# Patient Record
Sex: FEMALE | Race: WHITE | NOT HISPANIC OR LATINO
[De-identification: names, ages, dates, MRNs, and addresses within clinical notes are randomized per-mention and may not be internally consistent; named-entity substitution may affect disease eponyms.]

---

## 2017-01-05 ENCOUNTER — APPOINTMENT (OUTPATIENT)
Dept: ORTHOPEDIC SURGERY | Facility: CLINIC | Age: 62
End: 2017-01-05

## 2017-01-05 VITALS
HEIGHT: 60.63 IN | BODY MASS INDEX: 32.32 KG/M2 | OXYGEN SATURATION: 97 % | HEART RATE: 88 BPM | WEIGHT: 169 LBS | SYSTOLIC BLOOD PRESSURE: 108 MMHG | DIASTOLIC BLOOD PRESSURE: 70 MMHG

## 2017-01-05 DIAGNOSIS — E21.0 PRIMARY HYPERPARATHYROIDISM: ICD-10-CM

## 2017-03-21 ENCOUNTER — INPATIENT (INPATIENT)
Facility: HOSPITAL | Age: 62
LOS: 0 days | Discharge: HOME | End: 2017-03-21
Attending: EMERGENCY MEDICINE | Admitting: INTERNAL MEDICINE

## 2017-03-28 ENCOUNTER — APPOINTMENT (OUTPATIENT)
Dept: HEART AND VASCULAR | Facility: CLINIC | Age: 62
End: 2017-03-28

## 2017-03-28 VITALS
BODY MASS INDEX: 32.2 KG/M2 | SYSTOLIC BLOOD PRESSURE: 124 MMHG | HEIGHT: 60 IN | DIASTOLIC BLOOD PRESSURE: 90 MMHG | WEIGHT: 164 LBS | HEART RATE: 69 BPM

## 2017-03-28 VITALS — SYSTOLIC BLOOD PRESSURE: 134 MMHG | DIASTOLIC BLOOD PRESSURE: 94 MMHG

## 2017-03-28 DIAGNOSIS — Z86.39 PERSONAL HISTORY OF OTHER ENDOCRINE, NUTRITIONAL AND METABOLIC DISEASE: ICD-10-CM

## 2017-03-28 DIAGNOSIS — I51.7 CARDIOMEGALY: ICD-10-CM

## 2017-03-28 DIAGNOSIS — R73.9 HYPERGLYCEMIA, UNSPECIFIED: ICD-10-CM

## 2017-03-29 LAB
ALBUMIN SERPL ELPH-MCNC: 4.6 G/DL
ALP BLD-CCNC: 73 U/L
ALT SERPL-CCNC: 31 U/L
ANION GAP SERPL CALC-SCNC: 17 MMOL/L
AST SERPL-CCNC: 25 U/L
BASOPHILS # BLD AUTO: 0.04 K/UL
BASOPHILS NFR BLD AUTO: 0.5 %
BILIRUB SERPL-MCNC: 0.5 MG/DL
BUN SERPL-MCNC: 22 MG/DL
CALCIUM SERPL-MCNC: 10.8 MG/DL
CHLORIDE SERPL-SCNC: 98 MMOL/L
CHOLEST SERPL-MCNC: 154 MG/DL
CHOLEST/HDLC SERPL: 2.9 RATIO
CO2 SERPL-SCNC: 28 MMOL/L
CREAT SERPL-MCNC: 0.71 MG/DL
EOSINOPHIL # BLD AUTO: 0.33 K/UL
EOSINOPHIL NFR BLD AUTO: 4.5 %
GLUCOSE SERPL-MCNC: 88 MG/DL
HBA1C MFR BLD HPLC: 5.6 %
HCT VFR BLD CALC: 47.5 %
HDLC SERPL-MCNC: 54 MG/DL
HGB BLD-MCNC: 14.4 G/DL
IMM GRANULOCYTES NFR BLD AUTO: 0.1 %
LDLC SERPL CALC-MCNC: 78 MG/DL
LYMPHOCYTES # BLD AUTO: 2.46 K/UL
LYMPHOCYTES NFR BLD AUTO: 33.5 %
MAN DIFF?: NORMAL
MCHC RBC-ENTMCNC: 27 PG
MCHC RBC-ENTMCNC: 30.3 GM/DL
MCV RBC AUTO: 89 FL
MONOCYTES # BLD AUTO: 0.41 K/UL
MONOCYTES NFR BLD AUTO: 5.6 %
NEUTROPHILS # BLD AUTO: 4.1 K/UL
NEUTROPHILS NFR BLD AUTO: 55.8 %
PLATELET # BLD AUTO: 342 K/UL
POTASSIUM SERPL-SCNC: 4.8 MMOL/L
PROT SERPL-MCNC: 7.4 G/DL
RBC # BLD: 5.34 M/UL
RBC # FLD: 15.1 %
SODIUM SERPL-SCNC: 143 MMOL/L
TRIGL SERPL-MCNC: 111 MG/DL
TSH SERPL-ACNC: 1.98 UIU/ML
WBC # FLD AUTO: 7.35 K/UL

## 2017-05-22 ENCOUNTER — MEDICATION RENEWAL (OUTPATIENT)
Age: 62
End: 2017-05-22

## 2017-06-15 ENCOUNTER — APPOINTMENT (OUTPATIENT)
Dept: ORTHOPEDIC SURGERY | Facility: CLINIC | Age: 62
End: 2017-06-15

## 2017-06-15 VITALS
OXYGEN SATURATION: 98 % | BODY MASS INDEX: 33.47 KG/M2 | HEART RATE: 71 BPM | HEIGHT: 60.63 IN | WEIGHT: 175 LBS | SYSTOLIC BLOOD PRESSURE: 130 MMHG | DIASTOLIC BLOOD PRESSURE: 90 MMHG

## 2017-06-15 DIAGNOSIS — Z87.442 PERSONAL HISTORY OF URINARY CALCULI: ICD-10-CM

## 2017-06-15 DIAGNOSIS — Z84.89 FAMILY HISTORY OF OTHER SPECIFIED CONDITIONS: ICD-10-CM

## 2017-06-15 DIAGNOSIS — Z86.69 PERSONAL HISTORY OF OTHER DISEASES OF THE NERVOUS SYSTEM AND SENSE ORGANS: ICD-10-CM

## 2017-06-15 DIAGNOSIS — O00.90 UNSPECIFIED. ECTOPIC. PREGNANCY WITHOUT INTRAUTERINE PREGNANCY: ICD-10-CM

## 2017-06-15 DIAGNOSIS — M17.10 UNILATERAL PRIMARY OSTEOARTHRITIS, UNSPECIFIED KNEE: ICD-10-CM

## 2017-06-27 DIAGNOSIS — R10.13 EPIGASTRIC PAIN: ICD-10-CM

## 2017-06-27 DIAGNOSIS — E78.5 HYPERLIPIDEMIA, UNSPECIFIED: ICD-10-CM

## 2017-06-27 DIAGNOSIS — R07.9 CHEST PAIN, UNSPECIFIED: ICD-10-CM

## 2017-06-27 DIAGNOSIS — R06.02 SHORTNESS OF BREATH: ICD-10-CM

## 2017-06-27 DIAGNOSIS — K21.9 GASTRO-ESOPHAGEAL REFLUX DISEASE WITHOUT ESOPHAGITIS: ICD-10-CM

## 2017-06-27 DIAGNOSIS — E03.9 HYPOTHYROIDISM, UNSPECIFIED: ICD-10-CM

## 2017-06-27 DIAGNOSIS — R07.89 OTHER CHEST PAIN: ICD-10-CM

## 2017-08-14 ENCOUNTER — RX RENEWAL (OUTPATIENT)
Age: 62
End: 2017-08-14

## 2018-01-11 ENCOUNTER — RX RENEWAL (OUTPATIENT)
Age: 63
End: 2018-01-11

## 2018-01-23 ENCOUNTER — APPOINTMENT (OUTPATIENT)
Dept: ORTHOPEDIC SURGERY | Facility: CLINIC | Age: 63
End: 2018-01-23
Payer: COMMERCIAL

## 2018-01-23 VITALS
HEART RATE: 96 BPM | DIASTOLIC BLOOD PRESSURE: 80 MMHG | OXYGEN SATURATION: 96 % | WEIGHT: 172 LBS | HEIGHT: 60 IN | BODY MASS INDEX: 33.77 KG/M2 | SYSTOLIC BLOOD PRESSURE: 120 MMHG

## 2018-01-23 DIAGNOSIS — Z87.39 PERSONAL HISTORY OF OTHER DISEASES OF THE MUSCULOSKELETAL SYSTEM AND CONNECTIVE TISSUE: ICD-10-CM

## 2018-01-23 DIAGNOSIS — Z86.39 PERSONAL HISTORY OF OTHER ENDOCRINE, NUTRITIONAL AND METABOLIC DISEASE: ICD-10-CM

## 2018-01-23 PROCEDURE — 99215 OFFICE O/P EST HI 40 MIN: CPT

## 2018-01-24 ENCOUNTER — TRANSCRIPTION ENCOUNTER (OUTPATIENT)
Age: 63
End: 2018-01-24

## 2018-05-31 ENCOUNTER — APPOINTMENT (OUTPATIENT)
Dept: HEART AND VASCULAR | Facility: CLINIC | Age: 63
End: 2018-05-31
Payer: COMMERCIAL

## 2018-05-31 ENCOUNTER — APPOINTMENT (OUTPATIENT)
Dept: HEART AND VASCULAR | Facility: CLINIC | Age: 63
End: 2018-05-31

## 2018-05-31 VITALS
WEIGHT: 168 LBS | HEIGHT: 60 IN | SYSTOLIC BLOOD PRESSURE: 120 MMHG | DIASTOLIC BLOOD PRESSURE: 86 MMHG | HEART RATE: 93 BPM | BODY MASS INDEX: 32.98 KG/M2

## 2018-05-31 PROCEDURE — 36415 COLL VENOUS BLD VENIPUNCTURE: CPT

## 2018-05-31 PROCEDURE — 93000 ELECTROCARDIOGRAM COMPLETE: CPT

## 2018-05-31 PROCEDURE — 99244 OFF/OP CNSLTJ NEW/EST MOD 40: CPT | Mod: 25

## 2018-05-31 RX ORDER — SCOPALAMINE 1 MG/3D
1 PATCH, EXTENDED RELEASE TRANSDERMAL
Qty: 5 | Refills: 0 | Status: DISCONTINUED | COMMUNITY
Start: 2017-06-16 | End: 2018-05-31

## 2018-06-01 ENCOUNTER — APPOINTMENT (OUTPATIENT)
Dept: HEART AND VASCULAR | Facility: CLINIC | Age: 63
End: 2018-06-01
Payer: COMMERCIAL

## 2018-06-01 DIAGNOSIS — T14.8XXA OTHER INJURY OF UNSPECIFIED BODY REGION, INITIAL ENCOUNTER: ICD-10-CM

## 2018-06-01 LAB
ANION GAP SERPL CALC-SCNC: 14 MMOL/L
BUN SERPL-MCNC: 25 MG/DL
CALCIUM SERPL-MCNC: 10.3 MG/DL
CHLORIDE SERPL-SCNC: 101 MMOL/L
CHOLEST SERPL-MCNC: 186 MG/DL
CHOLEST/HDLC SERPL: 2.9 RATIO
CO2 SERPL-SCNC: 26 MMOL/L
CREAT SERPL-MCNC: 0.79 MG/DL
GLUCOSE SERPL-MCNC: 84 MG/DL
HBA1C MFR BLD HPLC: 5.5 %
HDLC SERPL-MCNC: 64 MG/DL
LDLC SERPL CALC-MCNC: 103 MG/DL
POTASSIUM SERPL-SCNC: 4.5 MMOL/L
SODIUM SERPL-SCNC: 141 MMOL/L
TRIGL SERPL-MCNC: 97 MG/DL
TSH SERPL-ACNC: 2.11 UIU/ML

## 2018-06-01 PROCEDURE — 93306 TTE W/DOPPLER COMPLETE: CPT

## 2018-06-01 PROCEDURE — 99213 OFFICE O/P EST LOW 20 MIN: CPT | Mod: 25

## 2018-06-01 PROCEDURE — 93015 CV STRESS TEST SUPVJ I&R: CPT

## 2018-06-06 LAB
APTT BLD: 35.2 SEC
BASOPHILS # BLD AUTO: 0.01 K/UL
BASOPHILS NFR BLD AUTO: 0.2 %
EOSINOPHIL # BLD AUTO: 0.14 K/UL
EOSINOPHIL NFR BLD AUTO: 2.2 %
HCT VFR BLD CALC: 45.4 %
HGB BLD-MCNC: 14.3 G/DL
IMM GRANULOCYTES NFR BLD AUTO: 0.2 %
INR PPP: 0.93 RATIO
LYMPHOCYTES # BLD AUTO: 1.71 K/UL
LYMPHOCYTES NFR BLD AUTO: 26.9 %
MAN DIFF?: NORMAL
MCHC RBC-ENTMCNC: 27 PG
MCHC RBC-ENTMCNC: 31.5 GM/DL
MCV RBC AUTO: 85.7 FL
MONOCYTES # BLD AUTO: 0.39 K/UL
MONOCYTES NFR BLD AUTO: 6.1 %
NEUTROPHILS # BLD AUTO: 4.1 K/UL
NEUTROPHILS NFR BLD AUTO: 64.4 %
PLATELET # BLD AUTO: 352 K/UL
PT BLD: 10.5 SEC
RBC # BLD: 5.3 M/UL
RBC # FLD: 15 %
WBC # FLD AUTO: 6.36 K/UL

## 2018-12-13 ENCOUNTER — APPOINTMENT (OUTPATIENT)
Dept: HEART AND VASCULAR | Facility: CLINIC | Age: 63
End: 2018-12-13

## 2019-01-17 ENCOUNTER — APPOINTMENT (OUTPATIENT)
Dept: ORTHOPEDIC SURGERY | Facility: CLINIC | Age: 64
End: 2019-01-17
Payer: COMMERCIAL

## 2019-01-17 VITALS
WEIGHT: 130 LBS | OXYGEN SATURATION: 97 % | SYSTOLIC BLOOD PRESSURE: 115 MMHG | HEART RATE: 68 BPM | HEIGHT: 60 IN | BODY MASS INDEX: 25.52 KG/M2 | DIASTOLIC BLOOD PRESSURE: 80 MMHG

## 2019-01-17 PROCEDURE — 99215 OFFICE O/P EST HI 40 MIN: CPT

## 2019-01-17 NOTE — ASSESSMENT
[FreeTextEntry1] : \par \par 63 year old woman with documented non secreting pituitary microadenoma, clinical evidence for  PHPT, with parathyroidectomy Fall 2016.  Serum PTH and calcium are now normal and have been stable for a year. 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 continue off alendronate; this was dc'ed in Jan 2017\par 2. labs for PTH/ca/alb; 25 D and thyroid in 12/19\par 3. continue LT4 75 mcg/day skipping a pill once a month (effective dose is 72.5 mcg)\par 4. for now, with normal VF to confrontation, no pituitary MRI and pt to have formal VF testing at optho at her next visit\par

## 2019-01-17 NOTE — HISTORY OF PRESENT ILLNESS
[FreeTextEntry1] : This is a 1 year f/u visit for this 64 yo woman with a hx of primary hyperparathyroidism, s/p parathyroidectomy in Fall of 2016.\par For complete notes, see prior visits 2/9/16, 4/14/16 and 6/23/16.\par \par Brief summary:\par 63  year old woman with documented non secreting pituitary microadenoma, and clinical evidence for  PHPT. The pt met surgical criteria for parathyroidectomy as she has both osteoporosis and nephrolithiasis. \par she underwent right lower parathyroid surgery 11/7/2016; and finding was parathyroid hyperplasia\par Patient had been on bisphosphonates for 3 years- she had a better response  to alendronate than to ibandronate.\par She has been off bisphosphonates since after parathyroid surgery (fall 2016-present)\par In addition, with regard to non-secreting pituitary adenoma, pt's most recent pituitary MRI was 12/2014; adenoma was 9 x 7 x 6 mm and pt had normal optho exam; updated normal optho exam (did not include visual fields) in Dec 2018\par \par Updated hx:\par pt has been seeing nutritionist for about 1 year and has lost nearly 40 pounds! \par one possible episode of gravel in 2017; but updated renal sonogram from 12/2018 showed no calculi\par \par Op Rx Hx:\par 12/2013-3/2016                 ibandronate                  2.5 years\par 3/2016- 1/2017                 alendroante                  10 months\par 1/17- 1/19                         holiday                          2  year\par \par post op tests:\par 1. surgical pathology: 200 mg Right lower parathyroid, hyperplasia\par \par today, labs done 12/20/18 were rev and compared with: 12/21/17 and  5/8/17, 1/25/17 and 11/22/16:\par PTH/ca: 51/9.3  alb: 4.1; prior PTH/ca: 42/10.1   alb: 4.5;  43/9.3  alb: 4.1; and 37/10.2  alb: 4.1; Nov 2016: ca: 9.8; prior 83/10.4  \par alb:3.8\par creat:0.68, prior  0.65, 0.74, prior 0.76\par 25 D: 54,  55,  39 and 45\par BSAP: 13.6, prior: 14.9,  10.9\par CTX: 495\par prolactin: 6.1\par \par TSH: 1.67, prior: 1.36, 1.52, and 1.32\par Free T4: 1.4, prior: 1.3, 1.3, and 1.3\par \par Most recent  DXA done 12/1817 compared with prior studies 12/17/15\par T-scores: -1.7, -2.9, -1.9, -1.5, -1.4 at the LS, L FN, L TH and L 1/3 radius and L UD radius\par this represents a 3.9% increase at spine, and 7.7% increase at TH and stable at forearm\par Prior forearm:\par 1/3 radius: -1.7\par UD radius: -1.4\par \par Today, updated sonogram of kidneys, 12/2018 rev compare with  sonogram of kidneys done 12/1/16 :\par 2018: essentially normal, (no hypdronephrosis) small cyst in lower pole of left kidney compared with:\par mild left hydronephrosis, similar to that seen on prior studies 11/2015 and January 2015.\par left ureteral calculus on CT in distal ureter, but not fully obstructive\par \par  thyroid ultrasound, done 7/21/16:\par both lobes of thyroid are homogeneous, normal size, isthmus has subcentimeter nodule of 0.6 x 0.5 x 0.7\par There is a 1.0 x 0.6 x0.3 cm nodule in the region of the right inferior parathyroid, likely an adenoma. at inferior right pole of thyroid\par \par PE: \par VFF to confrontation\par \par calcium: citracal petites, 1 AM and 2 PM = 600 mg calcium\par vitamin D: 5000 IU/week

## 2019-01-17 NOTE — REASON FOR VISIT
[Follow-Up: _____] : a [unfilled] follow-up visit [FreeTextEntry1] : management of previous primary hyperparathyroidism; s/p parathyroidectomy

## 2019-11-07 ENCOUNTER — APPOINTMENT (OUTPATIENT)
Dept: HEART AND VASCULAR | Facility: CLINIC | Age: 64
End: 2019-11-07
Payer: COMMERCIAL

## 2019-11-07 VITALS
BODY MASS INDEX: 28.47 KG/M2 | DIASTOLIC BLOOD PRESSURE: 90 MMHG | HEART RATE: 92 BPM | SYSTOLIC BLOOD PRESSURE: 130 MMHG | WEIGHT: 145 LBS | HEIGHT: 60 IN

## 2019-11-07 VITALS — DIASTOLIC BLOOD PRESSURE: 82 MMHG | SYSTOLIC BLOOD PRESSURE: 120 MMHG

## 2019-11-07 VITALS — DIASTOLIC BLOOD PRESSURE: 74 MMHG | SYSTOLIC BLOOD PRESSURE: 114 MMHG

## 2019-11-07 VITALS — DIASTOLIC BLOOD PRESSURE: 86 MMHG | SYSTOLIC BLOOD PRESSURE: 112 MMHG

## 2019-11-07 VITALS — DIASTOLIC BLOOD PRESSURE: 82 MMHG | SYSTOLIC BLOOD PRESSURE: 112 MMHG

## 2019-11-07 DIAGNOSIS — R40.4 TRANSIENT ALTERATION OF AWARENESS: ICD-10-CM

## 2019-11-07 DIAGNOSIS — R94.31 ABNORMAL ELECTROCARDIOGRAM [ECG] [EKG]: ICD-10-CM

## 2019-11-07 PROCEDURE — 93000 ELECTROCARDIOGRAM COMPLETE: CPT

## 2019-11-07 PROCEDURE — 36415 COLL VENOUS BLD VENIPUNCTURE: CPT

## 2019-11-07 PROCEDURE — 93306 TTE W/DOPPLER COMPLETE: CPT

## 2019-11-07 PROCEDURE — 99244 OFF/OP CNSLTJ NEW/EST MOD 40: CPT | Mod: 25

## 2019-11-07 NOTE — DISCUSSION/SUMMARY
[FreeTextEntry1] : The patient has had prolonged dizziness but is able to walk during the episode. She has had other episodes. Her EKG is abnormal but unchanged. The echocardiogram showed an ejection fraction of 65% without significant valvular disease. The patient will undergo a one-week cardiac monitor. There is no orthostatic change in her blood pressure. She will continue her current medication.

## 2019-11-07 NOTE — PHYSICAL EXAM
[Normal Appearance] : normal appearance [General Appearance - Well Developed] : well developed [General Appearance - Well Nourished] : well nourished [No Deformities] : no deformities [Well Groomed] : well groomed [Eyelids - No Xanthelasma] : the eyelids demonstrated no xanthelasmas [General Appearance - In No Acute Distress] : no acute distress [Normal Conjunctiva] : the conjunctiva exhibited no abnormalities [No Oral Pallor] : no oral pallor [Normal Oral Mucosa] : normal oral mucosa [Normal Jugular Venous A Waves Present] : normal jugular venous A waves present [No Oral Cyanosis] : no oral cyanosis [Normal Jugular Venous V Waves Present] : normal jugular venous V waves present [No Jugular Venous Laird A Waves] : no jugular venous laird A waves [Respiration, Rhythm And Depth] : normal respiratory rhythm and effort [Exaggerated Use Of Accessory Muscles For Inspiration] : no accessory muscle use [Auscultation Breath Sounds / Voice Sounds] : lungs were clear to auscultation bilaterally [Murmurs] : no murmurs present [Heart Rate And Rhythm] : heart rate and rhythm were normal [Heart Sounds] : normal S1 and S2 [Abdomen Soft] : soft [Abdomen Tenderness] : non-tender [Abnormal Walk] : normal gait [Gait - Sufficient For Exercise Testing] : the gait was sufficient for exercise testing [Abdomen Mass (___ Cm)] : no abdominal mass palpated [Cyanosis, Localized] : no localized cyanosis [Nail Clubbing] : no clubbing of the fingernails [Petechial Hemorrhages (___cm)] : no petechial hemorrhages [No Venous Stasis] : no venous stasis [] : no ischemic changes [No Skin Ulcers] : no skin ulcer [No Xanthoma] : no  xanthoma was observed [FreeTextEntry1] :  Darkening of skin over a third of left ventral forearm.  [Oriented To Time, Place, And Person] : oriented to person, place, and time [Mood] : the mood was normal [Affect] : the affect was normal [No Anxiety] : not feeling anxious

## 2019-11-07 NOTE — HISTORY OF PRESENT ILLNESS
[FreeTextEntry1] : The patient has been having lightheadedness and facial pressure. She walked 30 blocks to get here and felt dizzy for half of the trip. It has also been occurring at other times. It is mild and passes spontaneously. She has a mild nasal drip. She denies chest discomfort. The patient feels a racing heartbeat for half a minute. She denies vertigo. She is fasting.

## 2019-11-08 DIAGNOSIS — D72.819 DECREASED WHITE BLOOD CELL COUNT, UNSPECIFIED: ICD-10-CM

## 2019-11-08 LAB
25(OH)D3 SERPL-MCNC: 54.3 NG/ML
ALBUMIN SERPL ELPH-MCNC: 4.4 G/DL
ALP BLD-CCNC: 70 U/L
ALT SERPL-CCNC: 15 U/L
ANION GAP SERPL CALC-SCNC: 17 MMOL/L
AST SERPL-CCNC: 24 U/L
BASOPHILS # BLD AUTO: 0.03 K/UL
BASOPHILS NFR BLD AUTO: 0.8 %
BILIRUB SERPL-MCNC: 0.5 MG/DL
BUN SERPL-MCNC: 24 MG/DL
CALCIUM SERPL-MCNC: 9.5 MG/DL
CALCIUM SERPL-MCNC: 9.5 MG/DL
CHLORIDE SERPL-SCNC: 104 MMOL/L
CHOLEST SERPL-MCNC: 141 MG/DL
CHOLEST/HDLC SERPL: 2.2 RATIO
CO2 SERPL-SCNC: 21 MMOL/L
CREAT SERPL-MCNC: 0.65 MG/DL
EOSINOPHIL # BLD AUTO: 0.12 K/UL
EOSINOPHIL NFR BLD AUTO: 3.2 %
ESTIMATED AVERAGE GLUCOSE: 108 MG/DL
GLUCOSE SERPL-MCNC: 84 MG/DL
HBA1C MFR BLD HPLC: 5.4 %
HCT VFR BLD CALC: 45.3 %
HDLC SERPL-MCNC: 65 MG/DL
HGB BLD-MCNC: 14 G/DL
IMM GRANULOCYTES NFR BLD AUTO: 0 %
LDLC SERPL CALC-MCNC: 68 MG/DL
LYMPHOCYTES # BLD AUTO: 1.4 K/UL
LYMPHOCYTES NFR BLD AUTO: 37.4 %
MAN DIFF?: NORMAL
MCHC RBC-ENTMCNC: 28.2 PG
MCHC RBC-ENTMCNC: 30.9 GM/DL
MCV RBC AUTO: 91.1 FL
MONOCYTES # BLD AUTO: 0.3 K/UL
MONOCYTES NFR BLD AUTO: 8 %
NEUTROPHILS # BLD AUTO: 1.89 K/UL
NEUTROPHILS NFR BLD AUTO: 50.6 %
PARATHYROID HORMONE INTACT: 30 PG/ML
PLATELET # BLD AUTO: 263 K/UL
POTASSIUM SERPL-SCNC: 4.1 MMOL/L
PROT SERPL-MCNC: 6.9 G/DL
RBC # BLD: 4.97 M/UL
RBC # FLD: 13.6 %
SODIUM SERPL-SCNC: 142 MMOL/L
T4 FREE SERPL-MCNC: 1.6 NG/DL
TRIGL SERPL-MCNC: 40 MG/DL
TSH SERPL-ACNC: 1.95 UIU/ML
WBC # FLD AUTO: 3.74 K/UL

## 2019-11-17 LAB — ALP BONE SERPL-MCNC: 15 MCG/L

## 2019-11-21 ENCOUNTER — TRANSCRIPTION ENCOUNTER (OUTPATIENT)
Age: 64
End: 2019-11-21

## 2019-12-20 ENCOUNTER — APPOINTMENT (OUTPATIENT)
Dept: ORTHOPEDIC SURGERY | Facility: CLINIC | Age: 64
End: 2019-12-20

## 2019-12-31 ENCOUNTER — APPOINTMENT (OUTPATIENT)
Dept: ORTHOPEDIC SURGERY | Facility: CLINIC | Age: 64
End: 2019-12-31
Payer: COMMERCIAL

## 2019-12-31 VITALS
WEIGHT: 145 LBS | DIASTOLIC BLOOD PRESSURE: 84 MMHG | HEART RATE: 64 BPM | OXYGEN SATURATION: 97 % | BODY MASS INDEX: 28.47 KG/M2 | SYSTOLIC BLOOD PRESSURE: 140 MMHG | HEIGHT: 60 IN

## 2019-12-31 DIAGNOSIS — Z86.39 PERSONAL HISTORY OF OTHER ENDOCRINE, NUTRITIONAL AND METABOLIC DISEASE: ICD-10-CM

## 2019-12-31 PROCEDURE — 99214 OFFICE O/P EST MOD 30 MIN: CPT

## 2019-12-31 NOTE — HISTORY OF PRESENT ILLNESS
[FreeTextEntry1] : This is a 1 year f/u visit for this 65 yo woman with a hx of primary hyperparathyroidism, s/p parathyroidectomy in Fall of 2016.\par For complete notes, see prior visits 2/9/16, 4/14/16 and 6/23/16.\par \par Brief summary:\par 64  year old woman with documented non secreting pituitary microadenoma, and clinical evidence for  PHPT. The pt met surgical criteria for parathyroidectomy as she has both osteoporosis and nephrolithiasis. \par she underwent right lower parathyroid surgery 11/7/2016 (Dr. Matias Burnett); and finding was parathyroid hyperplasia\par Patient had been on bisphosphonates for 3 years- she had a better response  to alendronate than to ibandronate.\par She has been off bisphosphonates since after parathyroid surgery (fall 2016-present)\par In addition, with regard to non-secreting pituitary adenoma, pt's most recent pituitary MRI was 12/2014; adenoma was 9 x 7 x 6 mm and pt had normal optho exam; updated normal optho exam (did not include visual fields) in Dec 2018\par \par Updated hx:\par pt has been seeing nutritionist 2017,   and has lost nearly 40 pounds! \par one possible episode of gravel in 2017; but updated renal sonogram from 12/2018 showed no calculi\par \par Op Rx Hx:\par 12/2013-3/2016                 ibandronate                  2.5 years\par 3/2016- 1/2017                 alendroante                  10 months\par 1/17- 12/19                         holiday                          3  year\par \par post op tests:\par 1. surgical pathology: 200 mg Right lower parathyroid, hyperplasia\par \par today, labs done 11/7/19 were rev and compared with: 12/20/18 were rev and compared with: 12/21/17 and  5/8/17, 1/25/17 and 11/22/16:\par PTH/ca: 51/9.3  alb: 4.1; prior PTH/ca: 42/10.1   alb: 4.5;  43/9.3  alb: 4.1; and 37/10.2  alb: 4.1; Nov 2016: ca: 9.8; prior 83/10.4  \par alb:3.8\par creat:0.65, 0.68, prior  0.65, 0.74, prior 0.76\par 25 D: 54.3, 54,  55,  39 and 45\par BSAP: 15, 13.6, prior: 14.9,  10.9\par CTX: 391, 495\par prolactin: 6.1\par \par TSH: 1.95, 1.67, prior: 1.36, 1.52, and 1.32\par Free T4: 1.6, 1.4, prior: 1.3, 1.3, and 1.3\par \par Updated DXA, done 12/16/19 was rev and compared with:  DXA done 12/18/17 and 12/17/15\par T-scores: -2.1, -2.9, -2.4 and -1.3 at the LS, L FN, L TH and L 1/3 radius\par T-scores: -1.7, -2.9, -1.9, -1.5, -1.4 at the LS, L FN, L TH and L 1/3 radius and L UD radius\par this represents a 3.9% increase at spine, and 7.7% increase at TH and stable at forearm\par Prior forearm:\par 1/3 radius: -1.7\par UD radius: -1.4\par \par Today, updated sonogram of kidneys, 12/2018 rev compare with  sonogram of kidneys done 12/1/16 :\par 2018: essentially normal, (no hypdronephrosis) small cyst in lower pole of left kidney compared with:\par mild left hydronephrosis, similar to that seen on prior studies 11/2015 and January 2015.\par left ureteral calculus on CT in distal ureter, but not fully obstructive\par \par  thyroid ultrasound, done 7/21/16:\par both lobes of thyroid are homogeneous, normal size, isthmus has subcentimeter nodule of 0.6 x 0.5 x 0.7\par There is a 1.0 x 0.6 x0.3 cm nodule in the region of the right inferior parathyroid, likely an adenoma. at inferior right pole of thyroid\par \par PE: \par VFF to confrontation\par \par calcium: citracal petites, 1 AM and 2 PM = 600 mg calcium\par vitamin D: 5000 IU/week

## 2019-12-31 NOTE — ASSESSMENT
[FreeTextEntry1] : \par 64 year old woman with documented non secreting pituitary microadenoma  clinical evidence for  PHPT, with parathyroidectomy Fall 2016.  Serum PTH and calcium are now normal and have been stable. 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  Updated MRI can be considered if clinically indicated.\par Thyroid replacement is now optimal on 75 mcg/day (skipping one dose a month).\par \par Plan:\par 1. continue off alendronate; this was dc'ed in Jan 2017; might consider resume bisphosphonate or anti resorptive after next DXA.\par 2. labs for PTH/ca/alb; 25 D and thyroid in 12/20\par 3. continue LT4 75 mcg/day skipping a pill once a month (effective dose is 72.5 mcg)\par 4. for now, with normal VF to confrontation, no pituitary MRI and pt to have formal VF testing at optho at her next visit\par

## 2020-01-21 ENCOUNTER — APPOINTMENT (OUTPATIENT)
Dept: ORTHOPEDIC SURGERY | Facility: CLINIC | Age: 65
End: 2020-01-21

## 2020-10-27 ENCOUNTER — NON-APPOINTMENT (OUTPATIENT)
Age: 65
End: 2020-10-27

## 2020-11-17 ENCOUNTER — NON-APPOINTMENT (OUTPATIENT)
Age: 65
End: 2020-11-17

## 2020-11-17 ENCOUNTER — APPOINTMENT (OUTPATIENT)
Dept: HEART AND VASCULAR | Facility: CLINIC | Age: 65
End: 2020-11-17
Payer: COMMERCIAL

## 2020-11-17 ENCOUNTER — APPOINTMENT (OUTPATIENT)
Dept: ENDOCRINOLOGY | Facility: CLINIC | Age: 65
End: 2020-11-17
Payer: COMMERCIAL

## 2020-11-17 VITALS
WEIGHT: 150 LBS | HEIGHT: 60 IN | DIASTOLIC BLOOD PRESSURE: 90 MMHG | SYSTOLIC BLOOD PRESSURE: 132 MMHG | BODY MASS INDEX: 29.45 KG/M2

## 2020-11-17 VITALS
HEIGHT: 60 IN | SYSTOLIC BLOOD PRESSURE: 115 MMHG | HEART RATE: 84 BPM | DIASTOLIC BLOOD PRESSURE: 80 MMHG | WEIGHT: 150 LBS | BODY MASS INDEX: 29.45 KG/M2

## 2020-11-17 VITALS — TEMPERATURE: 98.4 F

## 2020-11-17 PROCEDURE — 93015 CV STRESS TEST SUPVJ I&R: CPT

## 2020-11-17 PROCEDURE — 99214 OFFICE O/P EST MOD 30 MIN: CPT | Mod: 25

## 2020-11-17 PROCEDURE — 99215 OFFICE O/P EST HI 40 MIN: CPT | Mod: 25

## 2020-11-17 PROCEDURE — 99072 ADDL SUPL MATRL&STAF TM PHE: CPT

## 2020-11-17 RX ORDER — CALCIUM CARBONATE/VITAMIN D3 600 MG-10
TABLET ORAL
Refills: 0 | Status: ACTIVE | COMMUNITY

## 2020-11-18 LAB
ANION GAP SERPL CALC-SCNC: 12 MMOL/L
BUN SERPL-MCNC: 26 MG/DL
CALCIUM SERPL-MCNC: 10.2 MG/DL
CHLORIDE SERPL-SCNC: 102 MMOL/L
CHOLEST SERPL-MCNC: 171 MG/DL
CO2 SERPL-SCNC: 27 MMOL/L
CREAT SERPL-MCNC: 0.63 MG/DL
GLUCOSE SERPL-MCNC: 97 MG/DL
HDLC SERPL-MCNC: 68 MG/DL
LDLC SERPL CALC-MCNC: 91 MG/DL
NONHDLC SERPL-MCNC: 103 MG/DL
POTASSIUM SERPL-SCNC: 4.4 MMOL/L
SODIUM SERPL-SCNC: 141 MMOL/L
TRIGL SERPL-MCNC: 56 MG/DL

## 2020-11-19 ENCOUNTER — NON-APPOINTMENT (OUTPATIENT)
Age: 65
End: 2020-11-19

## 2020-11-19 NOTE — REVIEW OF SYSTEMS
[Dyspnea on exertion] : dyspnea during exertion [Chest Pain] : chest pain [Palpitations] : palpitations [Dizziness] : dizziness [Negative] : Heme/Lymph

## 2020-11-19 NOTE — PHYSICAL EXAM
[General Appearance - Well Developed] : well developed [Normal Appearance] : normal appearance [Well Groomed] : well groomed [General Appearance - Well Nourished] : well nourished [No Deformities] : no deformities [General Appearance - In No Acute Distress] : no acute distress [Normal Conjunctiva] : the conjunctiva exhibited no abnormalities [Eyelids - No Xanthelasma] : the eyelids demonstrated no xanthelasmas [Normal Oral Mucosa] : normal oral mucosa [No Oral Pallor] : no oral pallor [No Oral Cyanosis] : no oral cyanosis [Normal Jugular Venous A Waves Present] : normal jugular venous A waves present [Normal Jugular Venous V Waves Present] : normal jugular venous V waves present [No Jugular Venous Laird A Waves] : no jugular venous laird A waves [Respiration, Rhythm And Depth] : normal respiratory rhythm and effort [Exaggerated Use Of Accessory Muscles For Inspiration] : no accessory muscle use [Auscultation Breath Sounds / Voice Sounds] : lungs were clear to auscultation bilaterally [Heart Rate And Rhythm] : heart rate and rhythm were normal [Heart Sounds] : normal S1 and S2 [Murmurs] : no murmurs present [Abdomen Soft] : soft [Abdomen Tenderness] : non-tender [Abdomen Mass (___ Cm)] : no abdominal mass palpated [Abnormal Walk] : normal gait [Gait - Sufficient For Exercise Testing] : the gait was sufficient for exercise testing [Nail Clubbing] : no clubbing of the fingernails [Cyanosis, Localized] : no localized cyanosis [Petechial Hemorrhages (___cm)] : no petechial hemorrhages [] : no rash [No Venous Stasis] : no venous stasis [No Skin Ulcers] : no skin ulcer [No Xanthoma] : no  xanthoma was observed [FreeTextEntry1] :  Darkening of skin over a third of left ventral forearm.  [Oriented To Time, Place, And Person] : oriented to person, place, and time [Affect] : the affect was normal [Mood] : the mood was normal [No Anxiety] : not feeling anxious

## 2020-11-19 NOTE — HISTORY OF PRESENT ILLNESS
[FreeTextEntry1] : The patient has felt a pulling near her chin. She walks 20 minutes without difficulty. She has dyspnea on an incline which is unchanged. The patient has lower chest discomfort in bed and not with exercise. It occurred last month twice a day and she hasn't now. It is a pressure/burning which lasts for one to two minutes. It occurs with overeating and is mild. She has had in the past. She also has infrequent palpitations and dizziness.

## 2020-11-23 ENCOUNTER — TRANSCRIPTION ENCOUNTER (OUTPATIENT)
Age: 65
End: 2020-11-23

## 2020-11-24 ENCOUNTER — TRANSCRIPTION ENCOUNTER (OUTPATIENT)
Age: 65
End: 2020-11-24

## 2021-04-10 ENCOUNTER — EMERGENCY (EMERGENCY)
Facility: HOSPITAL | Age: 66
LOS: 1 days | Discharge: HOME | End: 2021-04-10
Attending: EMERGENCY MEDICINE | Admitting: EMERGENCY MEDICINE
Payer: COMMERCIAL

## 2021-04-10 VITALS
OXYGEN SATURATION: 98 % | SYSTOLIC BLOOD PRESSURE: 108 MMHG | TEMPERATURE: 98 F | DIASTOLIC BLOOD PRESSURE: 71 MMHG | HEART RATE: 78 BPM | RESPIRATION RATE: 18 BRPM

## 2021-04-10 VITALS
DIASTOLIC BLOOD PRESSURE: 93 MMHG | OXYGEN SATURATION: 96 % | RESPIRATION RATE: 18 BRPM | WEIGHT: 160.06 LBS | TEMPERATURE: 99 F | SYSTOLIC BLOOD PRESSURE: 142 MMHG | HEART RATE: 86 BPM

## 2021-04-10 DIAGNOSIS — Z20.822 CONTACT WITH AND (SUSPECTED) EXPOSURE TO COVID-19: ICD-10-CM

## 2021-04-10 DIAGNOSIS — E78.5 HYPERLIPIDEMIA, UNSPECIFIED: ICD-10-CM

## 2021-04-10 DIAGNOSIS — R07.89 OTHER CHEST PAIN: ICD-10-CM

## 2021-04-10 DIAGNOSIS — E03.9 HYPOTHYROIDISM, UNSPECIFIED: ICD-10-CM

## 2021-04-10 LAB
ALBUMIN SERPL ELPH-MCNC: 4 G/DL — SIGNIFICANT CHANGE UP (ref 3.3–5)
ALP SERPL-CCNC: 71 U/L — SIGNIFICANT CHANGE UP (ref 40–120)
ALT FLD-CCNC: 24 U/L — SIGNIFICANT CHANGE UP (ref 10–45)
ANION GAP SERPL CALC-SCNC: 12 MMOL/L — SIGNIFICANT CHANGE UP (ref 5–17)
APTT BLD: 32.9 SEC — SIGNIFICANT CHANGE UP (ref 27.5–35.5)
AST SERPL-CCNC: 20 U/L — SIGNIFICANT CHANGE UP (ref 10–40)
BASOPHILS # BLD AUTO: 0.03 K/UL — SIGNIFICANT CHANGE UP (ref 0–0.2)
BASOPHILS NFR BLD AUTO: 0.7 % — SIGNIFICANT CHANGE UP (ref 0–2)
BILIRUB SERPL-MCNC: 0.5 MG/DL — SIGNIFICANT CHANGE UP (ref 0.2–1.2)
BUN SERPL-MCNC: 18 MG/DL — SIGNIFICANT CHANGE UP (ref 7–23)
CALCIUM SERPL-MCNC: 9.6 MG/DL — SIGNIFICANT CHANGE UP (ref 8.4–10.5)
CHLORIDE SERPL-SCNC: 106 MMOL/L — SIGNIFICANT CHANGE UP (ref 96–108)
CK SERPL-CCNC: 116 U/L — SIGNIFICANT CHANGE UP (ref 25–170)
CO2 SERPL-SCNC: 25 MMOL/L — SIGNIFICANT CHANGE UP (ref 22–31)
CREAT SERPL-MCNC: 0.56 MG/DL — SIGNIFICANT CHANGE UP (ref 0.5–1.3)
CRP SERPL-MCNC: 0.5 MG/L — SIGNIFICANT CHANGE UP (ref 0–4)
EOSINOPHIL # BLD AUTO: 0.11 K/UL — SIGNIFICANT CHANGE UP (ref 0–0.5)
EOSINOPHIL NFR BLD AUTO: 2.4 % — SIGNIFICANT CHANGE UP (ref 0–6)
GLUCOSE SERPL-MCNC: 87 MG/DL — SIGNIFICANT CHANGE UP (ref 70–99)
HCT VFR BLD CALC: 43.7 % — SIGNIFICANT CHANGE UP (ref 34.5–45)
HGB BLD-MCNC: 14 G/DL — SIGNIFICANT CHANGE UP (ref 11.5–15.5)
IMM GRANULOCYTES NFR BLD AUTO: 0.4 % — SIGNIFICANT CHANGE UP (ref 0–1.5)
INR BLD: 0.98 — SIGNIFICANT CHANGE UP (ref 0.88–1.16)
LYMPHOCYTES # BLD AUTO: 1.09 K/UL — SIGNIFICANT CHANGE UP (ref 1–3.3)
LYMPHOCYTES # BLD AUTO: 24.3 % — SIGNIFICANT CHANGE UP (ref 13–44)
MCHC RBC-ENTMCNC: 27.3 PG — SIGNIFICANT CHANGE UP (ref 27–34)
MCHC RBC-ENTMCNC: 32 GM/DL — SIGNIFICANT CHANGE UP (ref 32–36)
MCV RBC AUTO: 85.2 FL — SIGNIFICANT CHANGE UP (ref 80–100)
MONOCYTES # BLD AUTO: 0.32 K/UL — SIGNIFICANT CHANGE UP (ref 0–0.9)
MONOCYTES NFR BLD AUTO: 7.1 % — SIGNIFICANT CHANGE UP (ref 2–14)
NEUTROPHILS # BLD AUTO: 2.92 K/UL — SIGNIFICANT CHANGE UP (ref 1.8–7.4)
NEUTROPHILS NFR BLD AUTO: 65.1 % — SIGNIFICANT CHANGE UP (ref 43–77)
NRBC # BLD: 0 /100 WBCS — SIGNIFICANT CHANGE UP (ref 0–0)
NT-PROBNP SERPL-SCNC: 61 PG/ML — SIGNIFICANT CHANGE UP (ref 0–300)
PLATELET # BLD AUTO: 269 K/UL — SIGNIFICANT CHANGE UP (ref 150–400)
POTASSIUM SERPL-MCNC: 3.8 MMOL/L — SIGNIFICANT CHANGE UP (ref 3.5–5.3)
POTASSIUM SERPL-SCNC: 3.8 MMOL/L — SIGNIFICANT CHANGE UP (ref 3.5–5.3)
PROT SERPL-MCNC: 7.3 G/DL — SIGNIFICANT CHANGE UP (ref 6–8.3)
PROTHROM AB SERPL-ACNC: 11.8 SEC — SIGNIFICANT CHANGE UP (ref 10.6–13.6)
RBC # BLD: 5.13 M/UL — SIGNIFICANT CHANGE UP (ref 3.8–5.2)
RBC # FLD: 14.2 % — SIGNIFICANT CHANGE UP (ref 10.3–14.5)
SARS-COV-2 RNA SPEC QL NAA+PROBE: SIGNIFICANT CHANGE UP
SODIUM SERPL-SCNC: 143 MMOL/L — SIGNIFICANT CHANGE UP (ref 135–145)
TROPONIN T SERPL-MCNC: 0.01 NG/ML — SIGNIFICANT CHANGE UP (ref 0–0.01)
WBC # BLD: 4.49 K/UL — SIGNIFICANT CHANGE UP (ref 3.8–10.5)
WBC # FLD AUTO: 4.49 K/UL — SIGNIFICANT CHANGE UP (ref 3.8–10.5)

## 2021-04-10 PROCEDURE — 99285 EMERGENCY DEPT VISIT HI MDM: CPT | Mod: 25

## 2021-04-10 PROCEDURE — 71045 X-RAY EXAM CHEST 1 VIEW: CPT

## 2021-04-10 PROCEDURE — 85025 COMPLETE CBC W/AUTO DIFF WBC: CPT

## 2021-04-10 PROCEDURE — 86140 C-REACTIVE PROTEIN: CPT

## 2021-04-10 PROCEDURE — 85730 THROMBOPLASTIN TIME PARTIAL: CPT

## 2021-04-10 PROCEDURE — 80053 COMPREHEN METABOLIC PANEL: CPT

## 2021-04-10 PROCEDURE — 85610 PROTHROMBIN TIME: CPT

## 2021-04-10 PROCEDURE — 83880 ASSAY OF NATRIURETIC PEPTIDE: CPT

## 2021-04-10 PROCEDURE — 71045 X-RAY EXAM CHEST 1 VIEW: CPT | Mod: 26

## 2021-04-10 PROCEDURE — 93005 ELECTROCARDIOGRAM TRACING: CPT

## 2021-04-10 PROCEDURE — 36415 COLL VENOUS BLD VENIPUNCTURE: CPT

## 2021-04-10 PROCEDURE — 84484 ASSAY OF TROPONIN QUANT: CPT

## 2021-04-10 PROCEDURE — 82550 ASSAY OF CK (CPK): CPT

## 2021-04-10 PROCEDURE — U0003: CPT

## 2021-04-10 PROCEDURE — 93010 ELECTROCARDIOGRAM REPORT: CPT

## 2021-04-10 PROCEDURE — U0005: CPT

## 2021-04-10 RX ORDER — METOPROLOL TARTRATE 50 MG
1 TABLET ORAL
Qty: 30 | Refills: 0
Start: 2021-04-10 | End: 2021-05-09

## 2021-04-10 RX ORDER — METOPROLOL TARTRATE 50 MG
50 TABLET ORAL DAILY
Refills: 0 | Status: DISCONTINUED | OUTPATIENT
Start: 2021-04-10 | End: 2021-04-14

## 2021-04-10 RX ADMIN — Medication 50 MILLIGRAM(S): at 15:06

## 2021-04-10 NOTE — ED PROVIDER NOTE - NSFOLLOWUPINSTRUCTIONS_ED_ALL_ED_FT
CHEST PAIN - General Information           Chest Pain    WHAT YOU NEED TO KNOW:    What do I need to know about chest pain? Chest pain can be caused by a range of conditions, from not serious to life-threatening.    What may cause or increase my risk for chest pain?   •A digestion problem, such as acid reflux or a stomach ulcer      •An anxiety attack or a strong emotion, such as anger      •Infection, inflammation, or a fracture in the bones or cartilage in your chest      •Poor blood flow to your heart (angina)      •A life-threatening condition, such as a heart attack or blood clot in your lungs      What other symptoms might I have with chest pain?   •A burning feeling behind your breastbone      •A racing or slow heartbeat      •Fever or sweating      •Nausea or vomiting      •Shortness of breath      •Discomfort or pressure that spreads from your chest to your back, jaw, or arm      •Feeling weak, tired, or faint      How is the cause of chest pain diagnosed? Your healthcare provider will examine you. Describe your chest pain in as much detail as possible. Tell him or her where your pain is and when it began. Tell the provider if you notice anything that makes the pain worse or better. Tell him or her if it is constant or comes and goes. Your healthcare provider will ask about any medicines you use and medical conditions you have. He or she will also examine you. You may also need any of the following tests:  •An EKG is a test that records your heart's electrical activity.      •Blood tests check for heart damage and signs of a heart attack.      •An echocardiogram uses sound waves to see if blood is flowing normally through your heart.      •An ultrasound, x-ray, CT, or MRI scan may show the cause of your chest pain. You may be given contrast liquid to help your heart show up better in the pictures. Tell the healthcare provider if you have ever had an allergic reaction to contrast liquid. Do not enter the MRI room with anything metal. Metal can cause serious injury. Tell the healthcare provider if you have any metal in or on your body.      •An endoscopy may be done to check for ulcers or problem with your esophagus.  Upper Endoscopy           How is chest pain treated?   •Medicines may be given to treat the cause of your chest pain. Examples include pain medicine, anxiety medicine, or medicines to increase blood flow to your heart. Do not take certain medicines without asking your healthcare provider first. These include NSAIDs, herbal or vitamin supplements, or hormones (estrogen or progestin).      •A stent may be placed if your chest pain is caused by blockage in your heart. A stent is a wire mesh tube that helps hold your artery open. You may need more than 1 stent.      What are some healthy living tips? The following are general healthy guidelines. If the cause of your chest pain is known, your healthcare provider will give you specific guidelines to follow.  •Do not smoke. Nicotine and other chemicals in cigarettes and cigars can cause lung and heart damage. Ask your healthcare provider for information if you currently smoke and need help to quit. E-cigarettes or smokeless tobacco still contain nicotine. Talk to your healthcare provider before you use these products.      •Choose a variety of healthy foods as often as possible. Include fresh, frozen, or canned fruits and vegetables. Also include low-fat dairy products, fish, chicken (without skin), and lean meats. Your healthcare provider or a dietitian can help you create meal plans. You may need to avoid certain foods or drinks if your pain is caused by a digestion problem.  Healthy Foods           •Lower your sodium (salt) intake. Limit foods that are high in sodium, such as canned foods, salty snacks, and cold cuts. If you add salt when you cook food, do not add more at the table. Choose low-sodium canned foods as much as possible.             •Drink plenty of water every day. Water helps your body to control your temperature and blood pressure. Ask your healthcare provider how much water you should drink every day.      •Ask about activity. Your healthcare provider will tell you which activities to limit or avoid. Ask when you can drive, return to work, and have sex. Ask about the best exercise plan for you.      •Maintain a healthy weight. Ask your healthcare provider what a healthy weight is for you. Ask him or her to help you create a weight loss plan if you are overweight.      •Ask about vaccines you may need. Get the influenza (flu) vaccine every year as soon as recommended, usually in September or October. You may also need a pneumococcal vaccine to prevent pneumonia. The vaccine is usually given every 5 years, starting at age 65. Your healthcare provider can tell you if should get other vaccines, and when to get them.      Call your local emergency number (911 in the [place]) or have someone call if:   •You have any of the following signs of a heart attack: ?Squeezing, pressure, or pain in your chest      ?You may also have any of the following: ?Discomfort or pain in your back, neck, jaw, stomach, or arm      ?Shortness of breath      ?Nausea or vomiting      ?Lightheadedness or a sudden cold sweat            When should I seek immediate care?   •You have chest discomfort that gets worse, even with medicine.      •You cough or vomit blood.      •Your bowel movements are black or bloody.       •You cannot stop vomiting, or it hurts to swallow.      When should I call my doctor?   •You have questions or concerns about your condition or care.          CARE AGREEMENT:    You have the right to help plan your care. Learn about your health condition and how it may be treated. Discuss treatment options with your healthcare providers to decide what care you want to receive. You always have the right to refuse treatment.        © Copyright [employer] 2021           back to top                          © Copyright [employer] 2021

## 2021-04-10 NOTE — ED ADULT TRIAGE NOTE - CHIEF COMPLAINT QUOTE
Pt presents w/ weeks of intermittent, midsternal chest discomfort, leg heaviness, jaw pain that worsens with walking. Pt took 81mg ASA this am.

## 2021-04-10 NOTE — ED ADULT NURSE NOTE - CAS EDN DISCHARGE ASSESSMENT
Per Blanca Amador PA-C - \"ascites fluid positive for SBP, needs hospital admission for treatment.\" Contacted patients POA - Daughter, Brigitte and informed her of above. Brigitte states she will have [facility] transport via ambulance to [facility]. Contacted RN, Rena at [facility] and informed her of need for hospital admission via ER for treatment of SBP. Rena verbalized understanding and states she will notify the supervisor to arrange transport for patient. [facility] ER  notifed of pending patient arrival, report given. Inpatient Hepatology team, Dr. Duffy and Pastor Hancock were both notified of above.  
Alert and oriented to person, place and time

## 2021-04-10 NOTE — ED PROVIDER NOTE - PATIENT PORTAL LINK FT
You can access the FollowMyHealth Patient Portal offered by [facility] by registering at the following website: [email or web address]. By joining [employer]’s FollowMyHealth portal, you will also be able to view your health information using other applications (apps) compatible with our system.

## 2021-04-10 NOTE — ED PROVIDER NOTE - CLINICAL SUMMARY MEDICAL DECISION MAKING FREE TEXT BOX
Exertional cp and sob- sx concerning- but they have been present for many month w no sig change  ekg- unchanged compared to 2008, trop neg, cxr neg  d/w px's cards Dr Swanson- nehal w d/c home- start beta blocker- fu this week

## 2021-04-10 NOTE — ED ADULT NURSE NOTE - OBJECTIVE STATEMENT
Pt AOX4. Pt c/o intermittent midsternal chest pain for 3 weeks. Pt reports pain radiating to left arm. Pt denies fevers, chills, n/v, SOB, dizziness, weakness, numbness, tingling. Pt speaking in full complete sentences. Respirations even and unlabored.

## 2021-04-10 NOTE — ED PROVIDER NOTE - OBJECTIVE STATEMENT
65 F w pmh high lipids, hypothyroid- co exertional cp/pressure like sensation that comes on when she walks- has had similar pain for many months 65 F w pmh high lipids, hypothyroid- co exertional cp/pressure like sensation that comes on when she walks- has had similar pain for many months  chest presure/pain started after walking 1-2 blocks  moderate severity  similar to prior sx- also felt some ache in her jaw  neg stress test in the fall for the same sx  no f/c no cough  no le swelling/pain

## 2021-04-14 ENCOUNTER — APPOINTMENT (OUTPATIENT)
Dept: HEART AND VASCULAR | Facility: CLINIC | Age: 66
End: 2021-04-14
Payer: COMMERCIAL

## 2021-04-14 ENCOUNTER — NON-APPOINTMENT (OUTPATIENT)
Age: 66
End: 2021-04-14

## 2021-04-14 VITALS
WEIGHT: 157 LBS | HEART RATE: 60 BPM | SYSTOLIC BLOOD PRESSURE: 124 MMHG | OXYGEN SATURATION: 98 % | BODY MASS INDEX: 30.82 KG/M2 | DIASTOLIC BLOOD PRESSURE: 80 MMHG | HEIGHT: 60 IN

## 2021-04-14 DIAGNOSIS — Z00.00 ENCOUNTER FOR GENERAL ADULT MEDICAL EXAMINATION W/OUT ABNORMAL FINDINGS: ICD-10-CM

## 2021-04-14 DIAGNOSIS — Z86.79 PERSONAL HISTORY OF OTHER DISEASES OF THE CIRCULATORY SYSTEM: ICD-10-CM

## 2021-04-14 PROCEDURE — 93000 ELECTROCARDIOGRAM COMPLETE: CPT

## 2021-04-14 PROCEDURE — 99072 ADDL SUPL MATRL&STAF TM PHE: CPT

## 2021-04-14 PROCEDURE — 93306 TTE W/DOPPLER COMPLETE: CPT

## 2021-04-14 PROCEDURE — 99214 OFFICE O/P EST MOD 30 MIN: CPT | Mod: 25

## 2021-04-14 RX ORDER — MULTIVITAMIN
TABLET ORAL
Refills: 0 | Status: ACTIVE | COMMUNITY

## 2021-04-14 RX ORDER — ZINC SULFATE 50(220)MG
CAPSULE ORAL
Refills: 0 | Status: COMPLETED | COMMUNITY
End: 2021-04-14

## 2021-04-14 NOTE — DISCUSSION/SUMMARY
[FreeTextEntry1] : 65 year old female with PMHX of Wenckebach, GERD and Hypothyroidism here for follow up after recent ER at [facility] 04/10/2021 visit for chest pain. \par \par Chest Pain: Same as history but now occurring more often. EKG SR 58pm Get stress echocardiogram to rule out CAD\par JOHNSON:  One worse episode compared to her baseline. BNP from hospital negative. EKG as above. Physical unremarkable. Last stress EKG was normal. Will have her return for stress echocardiogram.\par Palpitations / Dizziness: She reports no palpitations at this visit. Dizziness occasional, no change from history. Holter Monitor done ( same symptoms ) on 12/2019 was negative. Follow up with PCP.\par Fasting labs sent today.\par \par \par Follow up for stress echocardiogram. 1/2 tab Metoprolol 2 nights prior and 0 Metoprolol the night before the stress test.  I have discussed the case with SONJA June. I have personally performed a history, physical exam, and my own medical decision making. I have reviewed the note and agree with the findings and plan with the following additions: The EKG shows only sinus bradycardia.  The episode of dyspnea was significant.  The coronaries may have changed since the last stress test.  Plan as above.\par

## 2021-04-14 NOTE — HISTORY OF PRESENT ILLNESS
[FreeTextEntry1] : 65 year old female with PMHX of Wenckebach, GERD and Hypothyroidism here for follow up after recent ER at [facility] 04/10/2021 visit for episode of dyspnea on exertion.  \par \par Patient was not feeling well that day. She noticed some dyspnea on exertion at a shorter distance then usual. Her at home blood pressure was elevated at 156/100. LABS Trop negative, BNP 61, H/H 14.0/43.7 and COVID negative. She was started on Metoprolol ER 50mg QD. \par \par She is feeling well today. She reports here chest discomfort are the same pains she has been having on and off for the past few years however has been increasing in episodes to almost daily. The discomfort is reported as always a light pressure or light sharp pain reported as fleeting lasting only seconds. Onset is usually random, while sitting, walking or doing stationary work. There is no associated nausea, palpations, dizziness or diaphoresis.\par \par She has long standing dyspnea on exertion which she reports no changes. She is able to walk up 2 flights of stairs without issue however is she is carrying heavy objects, like laundry, she admits to some out of breath. There is no correlation of the chest discomfort at this time. She denies any leg swelling, PND, orthopnea or cough.\par \par She denies any palpations. She continues to get rare dizzy episodes, reports room spinning, which also lasts for a few seconds with no correlation to exertion. She denies syncope.  \par \par She is fasting today\par

## 2021-04-14 NOTE — PHYSICAL EXAM
[General Appearance - Well Developed] : well developed [Normal Appearance] : normal appearance [General Appearance - Well Nourished] : well nourished [No Deformities] : no deformities [Normal Conjunctiva] : the conjunctiva exhibited no abnormalities [Eyelids - No Xanthelasma] : the eyelids demonstrated no xanthelasmas [] : no respiratory distress [Respiration, Rhythm And Depth] : normal respiratory rhythm and effort [Exaggerated Use Of Accessory Muscles For Inspiration] : no accessory muscle use [Auscultation Breath Sounds / Voice Sounds] : lungs were clear to auscultation bilaterally [Heart Rate And Rhythm] : heart rate and rhythm were normal [Murmurs] : no murmurs present [Edema] : no peripheral edema present [Veins - Varicosity Changes] : no varicosital changes were noted in the lower extremities [Abnormal Walk] : normal gait [Gait - Sufficient For Exercise Testing] : the gait was sufficient for exercise testing [Skin Color & Pigmentation] : normal skin color and pigmentation [No Venous Stasis] : no venous stasis [Skin Lesions] : no skin lesions [Oriented To Time, Place, And Person] : oriented to person, place, and time [Impaired Insight] : insight and judgment were intact [Affect] : the affect was normal [Mood] : the mood was normal [FreeTextEntry1] : DP 2+ bilaterally

## 2021-04-14 NOTE — REVIEW OF SYSTEMS
[Dyspnea on exertion] : dyspnea during exertion [Chest Pain] : chest pain [Heartburn] : heartburn [Dizziness] : dizziness [Fever] : no fever [Chills] : no chills [Shortness Of Breath] : no shortness of breath [Chest  Pressure] : no chest pressure [Lower Ext Edema] : no extremity edema [Leg Claudication] : no intermittent leg claudication [Palpitations] : no palpitations [Cough] : no cough [Abdominal Pain] : no abdominal pain [Nausea] : no nausea [Vomiting] : no vomiting [Numbness (Hypesthesia)] : no numbness [Tingling (Paresthesia)] : no tingling [Easy Bleeding] : no tendency for easy bleeding [Easy Bruising] : no tendency for easy bruising

## 2021-04-15 ENCOUNTER — TRANSCRIPTION ENCOUNTER (OUTPATIENT)
Age: 66
End: 2021-04-15

## 2021-04-19 ENCOUNTER — TRANSCRIPTION ENCOUNTER (OUTPATIENT)
Age: 66
End: 2021-04-19

## 2021-04-20 LAB
ANION GAP SERPL CALC-SCNC: 9 MMOL/L
BASOPHILS # BLD AUTO: 0.03 K/UL
BASOPHILS NFR BLD AUTO: 0.6 %
BUN SERPL-MCNC: 28 MG/DL
CALCIUM SERPL-MCNC: 9.7 MG/DL
CHLORIDE SERPL-SCNC: 102 MMOL/L
CHOLEST SERPL-MCNC: 173 MG/DL
CO2 SERPL-SCNC: 26 MMOL/L
CREAT SERPL-MCNC: 0.61 MG/DL
EOSINOPHIL # BLD AUTO: 0.14 K/UL
EOSINOPHIL NFR BLD AUTO: 2.8 %
ESTIMATED AVERAGE GLUCOSE: 111 MG/DL
GLUCOSE SERPL-MCNC: 86 MG/DL
HBA1C MFR BLD HPLC: 5.5 %
HCT VFR BLD CALC: 47.4 %
HCT VFR BLD CALC: 47.9 %
HDLC SERPL-MCNC: 66 MG/DL
HGB BLD-MCNC: 13.9 G/DL
HGB BLD-MCNC: 14.1 G/DL
IMM GRANULOCYTES NFR BLD AUTO: 0.2 %
LDLC SERPL CALC-MCNC: 93 MG/DL
LYMPHOCYTES # BLD AUTO: 1.45 K/UL
LYMPHOCYTES NFR BLD AUTO: 29.4 %
MAN DIFF?: NORMAL
MCHC RBC-ENTMCNC: 27.3 PG
MCHC RBC-ENTMCNC: 29.3 GM/DL
MCV RBC AUTO: 93.1 FL
MONOCYTES # BLD AUTO: 0.32 K/UL
MONOCYTES NFR BLD AUTO: 6.5 %
NEUTROPHILS # BLD AUTO: 2.98 K/UL
NEUTROPHILS NFR BLD AUTO: 60.5 %
NONHDLC SERPL-MCNC: 107 MG/DL
PLATELET # BLD AUTO: 289 K/UL
POTASSIUM SERPL-SCNC: 4.5 MMOL/L
RBC # BLD: 5.09 M/UL
RBC # FLD: 14.6 %
SODIUM SERPL-SCNC: 138 MMOL/L
TRIGL SERPL-MCNC: 68 MG/DL
TSH SERPL-ACNC: 1.09 UIU/ML
WBC # FLD AUTO: 4.93 K/UL

## 2021-04-27 ENCOUNTER — TRANSCRIPTION ENCOUNTER (OUTPATIENT)
Age: 66
End: 2021-04-27

## 2021-04-28 ENCOUNTER — TRANSCRIPTION ENCOUNTER (OUTPATIENT)
Age: 66
End: 2021-04-28

## 2021-05-03 ENCOUNTER — APPOINTMENT (OUTPATIENT)
Dept: HEART AND VASCULAR | Facility: CLINIC | Age: 66
End: 2021-05-03
Payer: COMMERCIAL

## 2021-05-03 VITALS
HEART RATE: 74 BPM | DIASTOLIC BLOOD PRESSURE: 86 MMHG | SYSTOLIC BLOOD PRESSURE: 136 MMHG | WEIGHT: 160 LBS | BODY MASS INDEX: 31.41 KG/M2 | HEIGHT: 60 IN

## 2021-05-03 VITALS — TEMPERATURE: 97.8 F

## 2021-05-03 DIAGNOSIS — R06.89 OTHER ABNORMALITIES OF BREATHING: ICD-10-CM

## 2021-05-03 PROCEDURE — 99072 ADDL SUPL MATRL&STAF TM PHE: CPT

## 2021-05-03 PROCEDURE — 99213 OFFICE O/P EST LOW 20 MIN: CPT | Mod: 25

## 2021-05-03 PROCEDURE — 93015 CV STRESS TEST SUPVJ I&R: CPT

## 2021-05-03 NOTE — HISTORY OF PRESENT ILLNESS
[FreeTextEntry1] : The patient was seen in the emergency room for severe episode of dyspnea.  She walks 45 minutes without difficulty.  She has dyspnea on an incline.  The patient has palpitations after heavy meal which are not frequent and last 30 seconds.  She has mild dizziness which is unchanged.

## 2021-05-03 NOTE — DISCUSSION/SUMMARY
[FreeTextEntry1] : The patient was seen in the emergency room for severe episode of dyspnea.  She now has dyspnea with moderate exertion.  She denies chest discomfort.  She has infrequent palpitations and dizziness.  The stress EKG is normal.  Significant coronary artery disease is unlikely.  Her last cholesterol was good.  She will continue her current medication.

## 2021-05-04 RX ORDER — METOPROLOL SUCCINATE 50 MG/1
50 TABLET, EXTENDED RELEASE ORAL
Refills: 0 | Status: DISCONTINUED | COMMUNITY
End: 2021-05-04

## 2021-05-17 ENCOUNTER — TRANSCRIPTION ENCOUNTER (OUTPATIENT)
Age: 66
End: 2021-05-17

## 2021-10-26 ENCOUNTER — NON-APPOINTMENT (OUTPATIENT)
Age: 66
End: 2021-10-26

## 2021-10-29 ENCOUNTER — TRANSCRIPTION ENCOUNTER (OUTPATIENT)
Age: 66
End: 2021-10-29

## 2021-11-15 ENCOUNTER — RX RENEWAL (OUTPATIENT)
Age: 66
End: 2021-11-15

## 2021-12-21 ENCOUNTER — NON-APPOINTMENT (OUTPATIENT)
Age: 66
End: 2021-12-21

## 2021-12-27 ENCOUNTER — TRANSCRIPTION ENCOUNTER (OUTPATIENT)
Age: 66
End: 2021-12-27

## 2021-12-27 ENCOUNTER — RX RENEWAL (OUTPATIENT)
Age: 66
End: 2021-12-27

## 2021-12-29 ENCOUNTER — TRANSCRIPTION ENCOUNTER (OUTPATIENT)
Age: 66
End: 2021-12-29

## 2022-01-03 ENCOUNTER — TRANSCRIPTION ENCOUNTER (OUTPATIENT)
Age: 67
End: 2022-01-03

## 2022-01-05 ENCOUNTER — APPOINTMENT (OUTPATIENT)
Dept: HEART AND VASCULAR | Facility: CLINIC | Age: 67
End: 2022-01-05

## 2022-01-06 NOTE — PHYSICAL EXAM
[Alert] : alert [Healthy Appearance] : healthy appearance [No Acute Distress] : no acute distress [Normal Sclera/Conjunctiva] : normal sclera/conjunctiva [Visual Fields Grossly Intact] : visual fields grossly intact [No Neck Mass] : no neck mass was observed [No LAD] : no lymphadenopathy [Supple] : the neck was supple [Thyroid Not Enlarged] : the thyroid was not enlarged [Well Healed Scar] : well healed scar [No Respiratory Distress] : no respiratory distress [Clear to Auscultation] : lungs were clear to auscultation bilaterally [Normal S1, S2] : normal S1 and S2 [Normal Rate] : heart rate was normal [Regular Rhythm] : with a regular rhythm [No Spinal Tenderness] : no spinal tenderness [No Stigmata of Cushings Syndrome] : no stigmata of Cushings Syndrome [Normal Gait] : normal gait [Normal Insight/Judgement] : insight and judgment were intact [Kyphosis] : no kyphosis present [Scoliosis] : no scoliosis [Acanthosis Nigricans] : no acanthosis nigricans [de-identified] : no moon facies, no supraclavicular fat pads [de-identified] : no difficulty balancing on one leg bilaterally

## 2022-01-06 NOTE — ADDENDUM
[FreeTextEntry1] : Recent bone density and MRI pituitary as below; discussed with Ms. Contreras. Her last bone density was significant for T-scores of -2.1 at the lumbar spine (L1 and L4 excluded this year), -2.9 at the femoral neck, -2.4 at the total hip. While not directly comparable, there may have been declines at the spine and hip sites from previous. We will start alendronate as above. Pituitary MRI report with stable pituitary microadenoma; there was a nonspecific solitary punctate focus of right frontal subcortical white matter T2 prolongation. She has no neurologic symptoms; advised she further discuss with her primary care provider. 12/23/20\par \par Ms. Contreras clarified that she has a remote history of symptomatic nephrolithiasis around 2006, a possible stone that may have passed around 2010; imaging has demonstrated small stones over the years per her report. Her last 24 hour urine calcium was 305 mg with total volume 860 mL and she has been advised to drink more water by her urologist, with possible addition of a thiazide diuretic. I advised a low sodium diet. I advised dietary calcium over calcium supplements. She will repeat a 24 hour urine collection in 3 months. 2/10/21\par \par Recent bone density as below. There has been an improvement at the lumbar spine from previous; the hip and wrist sites are overall stable. I will discuss with Ms. Contreras at her upcoming appointment. 12/29/21\par \par Recent laboratory results as below. Calciotropic panel within range. TSH within range. I will discuss with Ms. Contreras at her upcoming appointment. 1/06/22\par \par Laboratories (January 3, 2022) reviewed and significant for: \par Unremarkable complete blood count\par Calcium 9.8 mg/dL (albumin 4.1 g/dL)\par PTH 48 pg/mL\par 25-hydroxyvitamin D 36 ng/mL\par BUN/creatinine 23/0.61 mg/dL (eGFR 96 mL/min)\par Alkaline phosphatase 67 U/L\par Phosphorus 4.0 mg/dL\par Magnesium 1.8 mg/dL\par TSH 1.67 uIU/mL\par \par Most recent bone mineral density\par Date: December 29, 2021\par Source: [facility]\par Site: [facility]\par \par Site	BMD (g/cm2)	T-score	Change previous	Change baseline	\par Lumbar spine	0.828	-2.3\par Femoral neck	0.481	-3.3	\par Total hip	                0.671       -2.2         \par Distal radius           	0.610       -1.3         \par DXA Comments: L1 excluded; left hip\par \par MRI pituitary (December 21, 2020) reviewed and significant for:\par Compared to 12/3/2014, there is redemonstration of a hypoenhancing cystic lesion along the anterosuperior aspect of the pituitary gland, measuring approximately 9 mm transverse by 6 mm craniocaudal by 6 mm anteroposterior, and exhibiting mild suprasellar extension overall not substantially changed in size allowing for differences in technique. The pituitary gland otherwise exhibits normal size and morphology. The pituitary stalk is normal in size and midline. There is no mass effect upon the optic chiasm. The cavernous sinuses appear symmetric.\par And axial and FLAIR images of the brain demonstrate a solitary punctate focus of right frontal subcortical white matter T2 prolongation (sagittal FLAIR series 8 image #16), nonspecific.\par IMPRESSION:  \par 1. Redemonstration of an approximately 9 x 6 x 6 mm hypoenhancing cystic lesion along the anterosuperior aspect of the pituitary gland with mild suprasellar extension, not substantially changed in size compared to 12/3/2014 allowing for differences in technique. Differential considerations include a cystic pituitary microadenoma versus exophytic pituitary/Rathke's cleft cyst.\par 2. Solitary punctate focus of right frontal subcortical white matter T2 prolongation, nonspecific.\par \par Date: December 21, 2020\par Source: [facility]\par Site: [facility]\par \par Site	BMD (g/cm2)	T-score	Change previous	Change baseline	\par Lumbar spine	0.770	-2.6\par Femoral neck	0.484	-3.3	Left\par Total hip	                0.675       -2.2         \par Distal radius           	0.616       -1.3         \par DXA Comments:

## 2022-01-06 NOTE — ASSESSMENT
[Bisphosphonates] : The patient was instructed to take bisphosphonates on an empty stomach with a full glass of water,and wait at least 30 minutes before eating or lying down [FreeTextEntry1] : Osteoporosis. She has no history of fragility fracture. She has a history of prior osteoporosis therapy with ibandronate and alendronate, last in January 2017. We discussed the potential benefits and risks of antiresorptive osteoporosis therapy at length, including but not limited to osteonecrosis of the jaw and atypical femoral fracture. She is amenable to therapy with alendronate. We reviewed proper use and compliance with alendronate. \par Start alendronate 70 mg weekly\par Interval bone density testing in December 2020 as a treatment baseline\par Calcium 1200 mg daily from diet and supplements (to be taken in divided doses as no more than 500-600 mg can be absorbed at one time); advised increased supplemental calcium\par Continue current vitamin D regimen\par Diet, exercise and fall prevention discussed\par \par Nonsecreting pituitary adenoma. She was diagnosed with a nonsecreting pituitary microadenoma around 2010 in the setting of dizziness. Her last MRI was in December 2014 measuring the adenoma at 9 x 7 x 6 mm. \par Interval MRI pituitary\par Visual field testing\par \par Hypothyroidism. She has been clinically and biochemically euthyroid on her current regimen of levothyroxine. We reviewed proper use and compliance with levothyroxine. \par Continue levothyroxine 75 mcg daily (skips one dose per month for average daily dose 72.5 mcg)\par \par I reviewed the DXA performed on December 16, 2019 with the patient today.\par I reviewed the laboratories performed on November 5, 2020 with the patient today. \par I counseled the patient regarding calcium and vitamin D intake today.\par I discussed the following osteoporosis therapies: Alendronate, risedronate, ibandronate, zoledronic acid, denosumab\par \par CC:\par Dr. Prieto Neal, Fax [phone]\par Dr. Uriah Montano, Fax [phone]

## 2022-01-06 NOTE — DATA REVIEWED
[FreeTextEntry1] : Laboratories (November 5, 2020) reviewed and significant for: \par Calcium 9.8 mg/dL (albumin 4.3 g/dL)\par PTH 46 pg/mL\par 25-hydroxyvitamin D 4 ng/mL\par BUN/creatinine 25/0.61 mg/dL (eGFR 97 mL/min)\par Alkaline phosphatase 91 U/L\par Bone-specific alkaline phosphatase 19.5 mcg/L\par Phosphorus 4.0 mg/dL\par Magnesium 2.0 mg/dL\par TSH 1.01 uIU/mL\par \par Most recent bone mineral density\par Date: December 16, 2019\par Source: [facility]\par Site: [facility]\par \par Site	BMD (g/cm2)	T-score	Change previous	Change baseline	\par Lumbar spine	0.814	-2.1\par Femoral neck	0.522	-2.9	Left\par Total hip	                0.647       -2.4          Left\par Distal radius           	0.608       -1.3         \par DXA Comments:
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
## 2022-01-10 ENCOUNTER — RX RENEWAL (OUTPATIENT)
Age: 67
End: 2022-01-10

## 2022-01-21 ENCOUNTER — NON-APPOINTMENT (OUTPATIENT)
Age: 67
End: 2022-01-21

## 2022-01-21 ENCOUNTER — APPOINTMENT (OUTPATIENT)
Dept: HEART AND VASCULAR | Facility: CLINIC | Age: 67
End: 2022-01-21
Payer: COMMERCIAL

## 2022-01-21 VITALS
HEART RATE: 80 BPM | HEIGHT: 60 IN | RESPIRATION RATE: 96 BRPM | BODY MASS INDEX: 31.41 KG/M2 | WEIGHT: 160 LBS | DIASTOLIC BLOOD PRESSURE: 95 MMHG | SYSTOLIC BLOOD PRESSURE: 157 MMHG

## 2022-01-21 PROCEDURE — 99214 OFFICE O/P EST MOD 30 MIN: CPT | Mod: 25

## 2022-01-21 PROCEDURE — 93000 ELECTROCARDIOGRAM COMPLETE: CPT

## 2022-01-22 NOTE — DISCUSSION/SUMMARY
[FreeTextEntry1] : The patient has chest pain with some qualities of angina.  She has dyspnea on exertion at a fairly high level.  Her EKG is normal and unchanged, which was done for chest pain.  She will undergo nuclear stress test.  She has new onset of hypertension.  She will start losartan 50 mg daily.

## 2022-01-22 NOTE — HISTORY OF PRESENT ILLNESS
[FreeTextEntry1] : The patient has dyspnea on a steep incline but does not stop.  She has had a sensation in her arm chest and shoulder which is short-lived.  She has palpitations every other day for seconds.  Blood pressure was 160/95 both at home and at her Dr. Neal.  For the last month she has had heaviness in her chest associated with dyspnea which occurs at rest.  It is 5-6 out of 10 and lasts a minute.  .

## 2022-01-25 ENCOUNTER — APPOINTMENT (OUTPATIENT)
Dept: ENDOCRINOLOGY | Facility: CLINIC | Age: 67
End: 2022-01-25
Payer: COMMERCIAL

## 2022-01-25 VITALS
DIASTOLIC BLOOD PRESSURE: 92 MMHG | HEART RATE: 69 BPM | SYSTOLIC BLOOD PRESSURE: 137 MMHG | HEIGHT: 60 IN | BODY MASS INDEX: 32 KG/M2 | WEIGHT: 163 LBS

## 2022-01-25 DIAGNOSIS — R82.994 HYPERCALCIURIA: ICD-10-CM

## 2022-01-25 PROCEDURE — 99214 OFFICE O/P EST MOD 30 MIN: CPT

## 2022-01-25 RX ORDER — ALENDRONATE SODIUM 70 MG/1
70 TABLET ORAL
Qty: 12 | Refills: 0 | Status: DISCONTINUED | COMMUNITY
Start: 2020-11-17 | End: 2022-01-25

## 2022-01-25 NOTE — DATA REVIEWED
[FreeTextEntry1] : Laboratories (January 3, 2022) reviewed and significant for: \par Unremarkable complete blood count\par Calcium 9.8 mg/dL (albumin 4.1 g/dL)\par PTH 48 pg/mL\par 25-hydroxyvitamin D 36 ng/mL\par BUN/creatinine 23/0.61 mg/dL (eGFR 96 mL/min)\par Alkaline phosphatase 67 U/L\par Phosphorus 4.0 mg/dL\par Magnesium 1.8 mg/dL\par TSH 1.67 uIU/mL\par \par \par MRI pituitary (December 21, 2020) reviewed and significant for:\par Compared to 12/3/2014, there is redemonstration of a hypoenhancing cystic lesion along the anterosuperior aspect of the pituitary gland, measuring approximately 9 mm transverse by 6 mm craniocaudal by 6 mm anteroposterior, and exhibiting mild suprasellar extension overall not substantially changed in size allowing for differences in technique. The pituitary gland otherwise exhibits normal size and morphology. The pituitary stalk is normal in size and midline. There is no mass effect upon the optic chiasm. The cavernous sinuses appear symmetric.\par And axial and FLAIR images of the brain demonstrate a solitary punctate focus of right frontal subcortical white matter T2 prolongation (sagittal FLAIR series 8 image #16), nonspecific.\par IMPRESSION:  \par 1. Redemonstration of an approximately 9 x 6 x 6 mm hypoenhancing cystic lesion along the anterosuperior aspect of the pituitary gland with mild suprasellar extension, not substantially changed in size compared to 12/3/2014 allowing for differences in technique. Differential considerations include a cystic pituitary microadenoma versus exophytic pituitary/Rathke's cleft cyst.\par 2. Solitary punctate focus of right frontal subcortical white matter T2 prolongation, nonspecific.\par \par Most recent bone mineral density\par Date: December 29, 2021\par Source: [employer]\par Site: [facility]\par \par Site	BMD (g/cm2)	T-score	Change previous	Change baseline	\par Lumbar spine	0.828	-2.3\par Femoral neck	0.481	-3.3	\par Total hip	                0.671       -2.2         \par Distal radius           	0.610       -1.3         \par DXA Comments: L1 excluded; left hip\par \par Date: December 21, 2020\par Source: [facility]\par Site: [facility]\par \par Site	BMD (g/cm2)	T-score	Change previous	Change baseline	\par Lumbar spine	0.770	-2.6\par Femoral neck	0.484	-3.3	Left\par Total hip	                0.675       -2.2         \par Distal radius           	0.616       -1.3         \par DXA Comments:

## 2022-01-25 NOTE — PHYSICAL EXAM
[Alert] : alert [Healthy Appearance] : healthy appearance [No Acute Distress] : no acute distress [Normal Sclera/Conjunctiva] : normal sclera/conjunctiva [Visual Fields Grossly Intact] : visual fields grossly intact [No Respiratory Distress] : no respiratory distress [No Spinal Tenderness] : no spinal tenderness [No Stigmata of Cushings Syndrome] : no stigmata of Cushings Syndrome [Normal Gait] : normal gait [Normal Insight/Judgement] : insight and judgment were intact [Normal Hearing] : hearing was normal [Well Healed Scar] : well healed scar [Kyphosis] : no kyphosis present [Scoliosis] : no scoliosis [Acanthosis Nigricans] : no acanthosis nigricans [de-identified] : no moon facies, no supraclavicular fat pads
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
## 2022-01-25 NOTE — ASSESSMENT
[FreeTextEntry1] : Osteoporosis. She has no history of fragility fracture. She has a history of prior osteoporosis therapy with ibandronate and alendronate; we restarted alendronate therapy from November 2020 to present. Her most recent bone density demonstrates no change at the femoral neck, her lowest site. We discussed the potential benefits and risks of antiresorptive osteoporosis therapy at length, including but not limited to osteonecrosis of the jaw and atypical femoral fracture. We discussed switching therapy to denosumab pending insurance authorization and she is amenable. We discussed that denosumab must be dosed every 6 months due to rebound increase in bone breakdown with abrupt discontinuation of therapy, with transition to bisphosphonate therapy prior to a "drug holiday."\par Stop alendronate and start denosumab 60 mg SC every 6 months\par Check 24 hour urine calcium excretion to evaluate for hypercalciuria\par Calcium 1200 mg daily from diet and supplements (to be taken in divided doses as no more than 500-600 mg can be absorbed at one time)\par Continue current vitamin D regimen\par Diet, exercise and fall prevention discussed\par \par Nonsecreting pituitary adenoma. She was diagnosed with a nonsecreting pituitary microadenoma around 2010 in the setting of dizziness. Pituitary MRI from December 2020 with a stable 9 x 6 x 6 mm hypoenhancing cystic pituitary lesion. No mass effect on the optic chiasm.\par Consider interval MRI pituitary in December 2025\par \par Hypothyroidism. She has been clinically and biochemically euthyroid on her current regimen of levothyroxine. We reviewed proper use and compliance with levothyroxine. \par Continue levothyroxine 75 mcg daily (skips one dose per month for average daily dose 72.5 mcg)\par \par I reviewed the DXA performed on December 29, 2021 with the patient today.\par I reviewed the laboratories performed on January 3, 2022 with the patient today. \par I counseled the patient regarding calcium and vitamin D intake today.\par I discussed the following osteoporosis therapies: Alendronate, risedronate, ibandronate, zoledronic acid, denosumab\par \par CC:\par Dr. Prieto Neal, Fax [phone]\par Dr. Uriah Montano, Fax [phone]

## 2022-02-03 ENCOUNTER — NON-APPOINTMENT (OUTPATIENT)
Age: 67
End: 2022-02-03

## 2022-02-09 ENCOUNTER — NON-APPOINTMENT (OUTPATIENT)
Age: 67
End: 2022-02-09

## 2022-02-15 ENCOUNTER — TRANSCRIPTION ENCOUNTER (OUTPATIENT)
Age: 67
End: 2022-02-15

## 2022-02-18 ENCOUNTER — TRANSCRIPTION ENCOUNTER (OUTPATIENT)
Age: 67
End: 2022-02-18

## 2022-02-22 ENCOUNTER — TRANSCRIPTION ENCOUNTER (OUTPATIENT)
Age: 67
End: 2022-02-22

## 2022-03-01 ENCOUNTER — APPOINTMENT (OUTPATIENT)
Dept: ENDOCRINOLOGY | Facility: CLINIC | Age: 67
End: 2022-03-01
Payer: COMMERCIAL

## 2022-03-01 VITALS
WEIGHT: 165 LBS | DIASTOLIC BLOOD PRESSURE: 88 MMHG | HEART RATE: 75 BPM | SYSTOLIC BLOOD PRESSURE: 133 MMHG | BODY MASS INDEX: 32.22 KG/M2

## 2022-03-01 PROCEDURE — 96401 CHEMO ANTI-NEOPL SQ/IM: CPT

## 2022-03-01 NOTE — HISTORY OF PRESENT ILLNESS
[FreeTextEntry1] : Ms. BOATENG is a 66 year female with pmhx of Osteoporosis, PHPT (s/p parathyroidectomy in 2016), nonsecreting pituitary microadenoma who presents for follow-up for prolia injection.\par \par Presents today to initiate Prolia regimen\par Feels well, no complaints\par \par

## 2022-03-01 NOTE — ASSESSMENT
[FreeTextEntry1] : 1. Osteoporosis\par -- 60 mg denosumab (Prolia, NDC: 41626-705-59, SN: [id number], LOT [id number], exp 07/24 injected subcutaneously into left upper arm without complication\par -- Patient aware that next injection is due in 6 months and will return for this injection\par -- reviewed common SEs of prolia, all questions re: Prolia and its administration answered\par \par Follow-up in 6 months for next Prolia injection\par \par Alexa Kathleen, MS, FNP-BC, CDCES\par 03/01/2022\par \par

## 2022-03-21 ENCOUNTER — RX RENEWAL (OUTPATIENT)
Age: 67
End: 2022-03-21

## 2022-03-24 ENCOUNTER — APPOINTMENT (OUTPATIENT)
Dept: HEART AND VASCULAR | Facility: CLINIC | Age: 67
End: 2022-03-24
Payer: COMMERCIAL

## 2022-03-24 VITALS
DIASTOLIC BLOOD PRESSURE: 80 MMHG | HEIGHT: 60 IN | SYSTOLIC BLOOD PRESSURE: 124 MMHG | WEIGHT: 165 LBS | BODY MASS INDEX: 32.39 KG/M2 | HEART RATE: 89 BPM

## 2022-03-24 DIAGNOSIS — R07.9 CHEST PAIN, UNSPECIFIED: ICD-10-CM

## 2022-03-24 PROCEDURE — 93015 CV STRESS TEST SUPVJ I&R: CPT

## 2022-03-24 PROCEDURE — 99214 OFFICE O/P EST MOD 30 MIN: CPT | Mod: 25

## 2022-03-25 RX ORDER — DENOSUMAB 60 MG/ML
INJECTION SUBCUTANEOUS
Refills: 0 | Status: ACTIVE | COMMUNITY

## 2022-03-25 NOTE — PHYSICAL EXAM
[Well Developed] : well developed [Well Nourished] : well nourished [No Acute Distress] : no acute distress [Normal Conjunctiva] : normal conjunctiva [Normal Venous Pressure] : normal venous pressure [No Carotid Bruit] : no carotid bruit [Normal S1, S2] : normal S1, S2 [No Murmur] : no murmur [No Rub] : no rub [No Gallop] : no gallop [Good Air Entry] : good air entry [Clear Lung Fields] : clear lung fields [No Respiratory Distress] : no respiratory distress  [Soft] : abdomen soft [Non Tender] : non-tender [No Masses/organomegaly] : no masses/organomegaly [Normal Bowel Sounds] : normal bowel sounds [Normal Gait] : normal gait [No Edema] : no edema [No Cyanosis] : no cyanosis [No Clubbing] : no clubbing [No Rash] : no rash [No Varicosities] : no varicosities [No Skin Lesions] : no skin lesions [Moves all extremities] : moves all extremities [No Focal Deficits] : no focal deficits [Normal Speech] : normal speech [Alert and Oriented] : alert and oriented [Normal memory] : normal memory

## 2022-03-25 NOTE — DISCUSSION/SUMMARY
[FreeTextEntry1] : The patient has atypical chest discomfort.  She has also had palpitations.  The stress EKG is normal.  Significant coronary artery disease is unlikely.  She will discontinue her aspirin.  Her palpitations are unchanged.  Prior monitoring was unrevealing.  Her blood pressure is controlled.  She will continue losartan.

## 2022-03-25 NOTE — HISTORY OF PRESENT ILLNESS
[FreeTextEntry1] : The patient feels a racing heartbeat daily for 30 seconds.  She has infrequent dyspnea at rest.  She walks an hour and climbs stairs without difficulty.  She has a feeling in her chest which she cannot describe.  She has been off her diet.

## 2022-04-11 ENCOUNTER — RX RENEWAL (OUTPATIENT)
Age: 67
End: 2022-04-11

## 2022-05-23 ENCOUNTER — TRANSCRIPTION ENCOUNTER (OUTPATIENT)
Age: 67
End: 2022-05-23

## 2022-05-25 ENCOUNTER — TRANSCRIPTION ENCOUNTER (OUTPATIENT)
Age: 67
End: 2022-05-25

## 2022-05-26 ENCOUNTER — TRANSCRIPTION ENCOUNTER (OUTPATIENT)
Age: 67
End: 2022-05-26

## 2022-06-22 ENCOUNTER — NON-APPOINTMENT (OUTPATIENT)
Age: 67
End: 2022-06-22

## 2022-06-23 ENCOUNTER — TRANSCRIPTION ENCOUNTER (OUTPATIENT)
Age: 67
End: 2022-06-23

## 2022-06-23 ENCOUNTER — NON-APPOINTMENT (OUTPATIENT)
Age: 67
End: 2022-06-23

## 2022-06-24 ENCOUNTER — TRANSCRIPTION ENCOUNTER (OUTPATIENT)
Age: 67
End: 2022-06-24

## 2022-06-27 NOTE — ED ADULT NURSE NOTE - CHPI ED NUR SYMPTOMS NEG
I personally performed the service described in the documentation recorded by the scribe in my presence, and it accurately and completely records my words and actions.
no diaphoresis/no fever/no nausea/no shortness of breath/no syncope/no vomiting

## 2022-06-28 ENCOUNTER — APPOINTMENT (OUTPATIENT)
Dept: OBGYN | Facility: CLINIC | Age: 67
End: 2022-06-28
Payer: COMMERCIAL

## 2022-06-28 VITALS
SYSTOLIC BLOOD PRESSURE: 149 MMHG | HEIGHT: 60 IN | DIASTOLIC BLOOD PRESSURE: 80 MMHG | HEART RATE: 94 BPM | WEIGHT: 161 LBS | BODY MASS INDEX: 31.61 KG/M2

## 2022-06-28 PROCEDURE — 99386 PREV VISIT NEW AGE 40-64: CPT

## 2022-06-28 NOTE — COUNSELING
[Nutrition/ Exercise/ Weight Management] : nutrition, exercise, weight management [Vitamins/Supplements] : vitamins/supplements [Bladder Hygiene] : bladder hygiene [Other ___] : [unfilled]

## 2022-06-28 NOTE — HISTORY OF PRESENT ILLNESS
[Patient reported mammogram was normal] : Patient reported mammogram was normal [Patient reported breast sonogram was normal] : Patient reported breast sonogram was normal [Patient reported PAP Smear was normal] : Patient reported PAP Smear was normal [Patient reported bone density results were abnormal] : Patient reported bone density results were abnormal [Patient reported colonoscopy was normal] : Patient reported colonoscopy was normal [N] : Patient does not use contraception [Y] : Positive pregnancy history [FreeTextEntry1] : 65 yo presents for initial well women visit, her previous gynecologist does not take insurance and most of her physicians are in [facility]. Reports some urinary urgency and leakage. She had bleeding last year that was evaluated had biopsy.  [PGHxTotal] : 5 [[facility]] : 3 [PGHxPremature] : 0 [PGHxAbortions] : 0 [[facility] Center[facility]] : 3 [PGHxABInduced] : 0 [PGHxABSpont] : 1 [PGxEctopic] : 1 [PGHxMultBirths] : 0

## 2022-06-28 NOTE — PHYSICAL EXAM
[Chaperone Present] : A chaperone was present in the examining room during all aspects of the physical examination [Appropriately responsive] : appropriately responsive [Alert] : alert [No Acute Distress] : no acute distress [No Lymphadenopathy] : no lymphadenopathy [Soft] : soft [Non-tender] : non-tender [Non-distended] : non-distended [Oriented x3] : oriented x3 [Examination Of The Breasts] : a normal appearance [No Masses] : no breast masses were palpable [Labia Majora] : normal [Labia Minora] : normal [Normal] : normal [Uterine Adnexae] : normal [No Tenderness] : no tenderness [Nl Sphincter Tone] : normal sphincter tone [FreeTextEntry9] : no masses, no nodules

## 2022-06-28 NOTE — DISCUSSION/SUMMARY
[FreeTextEntry1] : 65 yo presents for well women visit\par \par HCM: pap done, breast imaging up to date, colon screening up to date\par \par Urinary issues: given information and education about Kegel exercises and bladder training\par \par -f/u 1 year/ PRN

## 2022-07-05 ENCOUNTER — TRANSCRIPTION ENCOUNTER (OUTPATIENT)
Age: 67
End: 2022-07-05

## 2022-07-05 LAB
CYTOLOGY CVX/VAG DOC THIN PREP: ABNORMAL
HPV HIGH+LOW RISK DNA PNL CVX: NOT DETECTED

## 2022-07-06 ENCOUNTER — TRANSCRIPTION ENCOUNTER (OUTPATIENT)
Age: 67
End: 2022-07-06

## 2022-07-07 ENCOUNTER — TRANSCRIPTION ENCOUNTER (OUTPATIENT)
Age: 67
End: 2022-07-07

## 2022-07-07 DIAGNOSIS — Z78.9 OTHER SPECIFIED HEALTH STATUS: ICD-10-CM

## 2022-07-08 ENCOUNTER — TRANSCRIPTION ENCOUNTER (OUTPATIENT)
Age: 67
End: 2022-07-08

## 2022-08-29 ENCOUNTER — APPOINTMENT (OUTPATIENT)
Dept: ENDOCRINOLOGY | Facility: CLINIC | Age: 67
End: 2022-08-29

## 2022-08-29 VITALS
DIASTOLIC BLOOD PRESSURE: 88 MMHG | SYSTOLIC BLOOD PRESSURE: 145 MMHG | BODY MASS INDEX: 31.44 KG/M2 | WEIGHT: 161 LBS | HEART RATE: 80 BPM

## 2022-08-29 PROCEDURE — 96401 CHEMO ANTI-NEOPL SQ/IM: CPT

## 2022-10-28 ENCOUNTER — TRANSCRIPTION ENCOUNTER (OUTPATIENT)
Age: 67
End: 2022-10-28

## 2022-11-08 NOTE — ED ADULT NURSE NOTE - SUICIDE SCREENING QUESTION 3

## 2022-11-23 ENCOUNTER — RX RENEWAL (OUTPATIENT)
Age: 67
End: 2022-11-23

## 2022-11-28 ENCOUNTER — NON-APPOINTMENT (OUTPATIENT)
Age: 67
End: 2022-11-28

## 2022-11-30 ENCOUNTER — APPOINTMENT (OUTPATIENT)
Dept: HEART AND VASCULAR | Facility: CLINIC | Age: 67
End: 2022-11-30

## 2023-01-03 DIAGNOSIS — Z12.39 ENCOUNTER FOR OTHER SCREENING FOR MALIGNANT NEOPLASM OF BREAST: ICD-10-CM

## 2023-01-03 DIAGNOSIS — R92.2 INCONCLUSIVE MAMMOGRAM: ICD-10-CM

## 2023-01-05 ENCOUNTER — RX RENEWAL (OUTPATIENT)
Age: 68
End: 2023-01-05

## 2023-01-09 ENCOUNTER — APPOINTMENT (OUTPATIENT)
Dept: HEART AND VASCULAR | Facility: CLINIC | Age: 68
End: 2023-01-09
Payer: COMMERCIAL

## 2023-01-09 ENCOUNTER — NON-APPOINTMENT (OUTPATIENT)
Age: 68
End: 2023-01-09

## 2023-01-09 VITALS
TEMPERATURE: 98 F | HEART RATE: 77 BPM | WEIGHT: 163 LBS | DIASTOLIC BLOOD PRESSURE: 90 MMHG | HEIGHT: 60 IN | BODY MASS INDEX: 32 KG/M2 | SYSTOLIC BLOOD PRESSURE: 142 MMHG

## 2023-01-09 DIAGNOSIS — I10 ESSENTIAL (PRIMARY) HYPERTENSION: ICD-10-CM

## 2023-01-09 DIAGNOSIS — I44.1 ATRIOVENTRICULAR BLOCK, SECOND DEGREE: ICD-10-CM

## 2023-01-09 DIAGNOSIS — E78.00 PURE HYPERCHOLESTEROLEMIA, UNSPECIFIED: ICD-10-CM

## 2023-01-09 PROCEDURE — 93000 ELECTROCARDIOGRAM COMPLETE: CPT

## 2023-01-09 PROCEDURE — 99214 OFFICE O/P EST MOD 30 MIN: CPT | Mod: 25

## 2023-01-09 NOTE — REVIEW OF SYSTEMS
[Fever] : no fever [Chills] : no chills [SOB] : no shortness of breath [Dyspnea on exertion] : not dyspnea during exertion [Chest Discomfort] : no chest discomfort [Lower Ext Edema] : no extremity edema [PND] : no PND [Dizziness] : no dizziness [Numbness (Hypoesthesia)] : no numbness [Weakness] : no weakness [Speech Disturbance] : no speech disturbance [Easy Bleeding] : no tendency for easy bleeding

## 2023-01-09 NOTE — DISCUSSION/SUMMARY
[FreeTextEntry1] : 67 year old female with HLD and Thyroid Disease here for follow up. \par \par Chest Discomfort /  EKG : NSR 63 bpm, PRWP, no change. Stress EKG normal ( 3/2022 ) Significant CAD not likely. Will return for echocardiogram. \par Palpitations:  Short lived, no associated symptoms. Previous monitors reviewed, no significant findings noted. She defers an additional monitor at this time\par Dizziness: Monitor unremarkable. Doubt cardiac related. \par HTN: Goal < 140/90 Losartan 50mg\par HLD: ASCVD score 7.85. Goal LDL < 100. Labs sent today. Continue with Simvastatin 20mg\par \par Follow up in 6 months. \par \par Follow up in 6 months with echocardiogram\par \par \par I, Dr. Angella Swanson personally performed the evaluation and management services for this patient who presents today with a changed problem.  The evaluation and management includes conducting the examination assessing all conditions and establishing a new plan of care.  Today my ACP Jacy June was here and recorded the evaluation and management services.                                                                        \par \par The patient states that her palpitations are new.  They were noted on the chart in the past.  Her episodes are brief.  If there is an increase in frequency she will contact us and we will send a monitor. [EKG obtained to assist in diagnosis and management of assessed problem(s)] : EKG obtained to assist in diagnosis and management of assessed problem(s)

## 2023-01-09 NOTE — PHYSICAL EXAM
[Well Developed] : well developed [Well Nourished] : well nourished [No Acute Distress] : no acute distress [No Carotid Bruit] : no carotid bruit [Normal S1, S2] : normal S1, S2 [Good Air Entry] : good air entry [No Respiratory Distress] : no respiratory distress  [Moves all extremities] : moves all extremities [No Focal Deficits] : no focal deficits [Normal Speech] : normal speech [Alert and Oriented] : alert and oriented [Normal memory] : normal memory [de-identified] : 2/6 systolic murmur heard at RUSB

## 2023-01-09 NOTE — HISTORY OF PRESENT ILLNESS
[FreeTextEntry1] : 67 year old female with HLD and Thyroid Disease here for follow up. \par \par Patient reports no changes in symptoms for the past few years. \par \par She remains to get short lived palpitations once every 2 weeks either after waking up or after a large meal. Symptoms reported as fast heart beat lasting a few seconds. She has no associated dizziness, shortness of breath at rest, falls, syncope or neuro focal deficits. \par \par She reports long standing dizziness. This also once every 2 weeks but not associated with palpitations. Dizziness reported as her head spinning but this is not dependent on movement. No change\par \par She gets dyspnea walking up 6-8 stair steps. She has no associated chest pains. She has no edema, orthopnea or cough. Also stable for a few years. \par \par She has a chest sensation reported as midsternal chest heaviness lasting 15 seconds.  This is non exertional and not related to her palpitations. Also long standing. \par \par Stress EKG ( 3/24/2022 ) 8:00 min, normal ekg test\[facility] Holter ( 12/2019 ) 7 Days. Sinus Rhythm. NO AFIB, PVCs or PACs\[facility] Holter ( 11/2019 ) 5 Days: SR. NO AFIB. PVCs with burden < 1%. HB once, 2 degree type 1 with slowest HR at 50 bpm ( [place] )

## 2023-01-10 LAB
ANION GAP SERPL CALC-SCNC: 10 MMOL/L
BUN SERPL-MCNC: 22 MG/DL
CALCIUM SERPL-MCNC: 9.3 MG/DL
CHLORIDE SERPL-SCNC: 104 MMOL/L
CHOLEST SERPL-MCNC: 155 MG/DL
CO2 SERPL-SCNC: 25 MMOL/L
CREAT SERPL-MCNC: 0.59 MG/DL
EGFR: 100 ML/MIN/1.73M2
GLUCOSE SERPL-MCNC: 89 MG/DL
HCT VFR BLD CALC: 44.1 %
HDLC SERPL-MCNC: 57 MG/DL
HGB BLD-MCNC: 13.4 G/DL
LDLC SERPL CALC-MCNC: 85 MG/DL
NONHDLC SERPL-MCNC: 98 MG/DL
NT-PROBNP SERPL-MCNC: 42 PG/ML
POTASSIUM SERPL-SCNC: 4.2 MMOL/L
SODIUM SERPL-SCNC: 139 MMOL/L
T4 FREE SERPL-MCNC: 1.5 NG/DL
TRIGL SERPL-MCNC: 67 MG/DL
TSH SERPL-ACNC: 1.56 UIU/ML

## 2023-01-12 NOTE — ED PROVIDER NOTE - WET READ LAUNCH
Barber Abbasi   1971, 54 y.o. female   [id number]       Referring Provider: Nora Perez DO  Referral Type: In an order in [facility]    Reason for Visit: Evaluation of the cause of disorders of hearing, tinnitus, or balance. ADULT AUDIOLOGIC EVALUATION      Barber Abbasi is a 54 y.o. female seen today, 1/12/2023 , for an initial audiologic evaluation. Patient was seen by Nora Perez DO following today's evaluation. AUDIOLOGIC AND OTHER PERTINENT MEDICAL HISTORY:      Barber Abbasi reports intermittent vertigo and dizziness episodes over the last month. She states sometimes when she lies on her back, she feels like the room is spinning, and she also will experience small waves of vertigo when sitting. She denies change in hearing or tinnitus during these episodes. She feels overall she doesn't hear as well as she used to, but doesn't struggle too much to hear. She reports intermittent aural fullness in the right ear. No other significant otologic history reported. She denied otorrhea, tinnitus, history of falls, history of significant noise exposure, history of head trauma, history of ear surgery, and family history of hearing loss. Date: 1/12/2023     IMPRESSIONS:      Today's results revealed essentially normal hearing sensitivity, bilaterally. Excellent speech understanding when in quiet. Tympanometry indicates normal middle ear function. Follow medical recommendations of Nora Perez DO.     ASSESSMENT AND FINDINGS:     Otoscopy unremarkable. RIGHT EAR:  Hearing Sensitivity: Hearing within normal limits through 4kHz then sloping to a mild hearing loss. Speech Recognition Threshold: 20 dB HL  Word Recognition: Excellent 100%, based on NU-6 25-word list at 60 dBHL using recorded speech stimuli. Tympanometry: Normal peak pressure and compliance, Type A tympanogram, consistent with normal middle ear function. Volume 1.2 cm3, Peak -11 daPa, 0.53 mmho.       LEFT EAR:  Hearing Sensitivity:Hearing sensitivity within normal limits from 250-8000 Hz  Speech Recognition Threshold: 15 dB HL  Word Recognition: Excellent 100%, based on NU-6 25-word list at 55 dBHL using recorded speech stimuli. Tympanometry: Normal peak pressure and compliance, Type A tympanogram, consistent with normal middle ear function. Volume 1.2 cm3, Peak -6 daPa, 0.66 mmho. Reliability: Good   Transducer: Inserts    See scanned audiogram dated 1/12/2023 for results. PATIENT EDUCATION:       The following items were discussed with the patient:   - Good Communication Strategies  - Dizziness    Educational information was shared in the After Visit Summary. RECOMMENDATIONS:                                                                                                                                                                                                                                                            The following items are recommended based on patient report and results from today's appointment:   - Continue medical follow-up with Jalil Krishnamurthy DO.   - Retest hearing as medically indicated and/or sooner if a change in hearing is noted. - Utilize \"Good Communication Strategies\" as discussed to assist in speech understanding with communication partners. - If medically indicated, consider vestibular evaluation to further investigate symptoms of dizziness.        Alban Bentley, Darrino  Audiologist    Chart CC'd to: Jalil Krishnamurthy DO      Degree of   Hearing Sensitivity dB Range   Within Normal Limits (WNL) 0 - 20   Mild 20 - 40   Moderate 40 - 55   Moderately-Severe 55 - 70   Severe 70 - 90   Profound 90 + <---- Click to enter wet read

## 2023-01-20 ENCOUNTER — RX RENEWAL (OUTPATIENT)
Age: 68
End: 2023-01-20

## 2023-02-07 ENCOUNTER — NON-APPOINTMENT (OUTPATIENT)
Age: 68
End: 2023-02-07

## 2023-03-10 ENCOUNTER — NON-APPOINTMENT (OUTPATIENT)
Age: 68
End: 2023-03-10

## 2023-03-14 ENCOUNTER — NON-APPOINTMENT (OUTPATIENT)
Age: 68
End: 2023-03-14

## 2023-03-15 ENCOUNTER — APPOINTMENT (OUTPATIENT)
Dept: ENDOCRINOLOGY | Facility: CLINIC | Age: 68
End: 2023-03-15
Payer: COMMERCIAL

## 2023-03-15 ENCOUNTER — APPOINTMENT (OUTPATIENT)
Dept: HEART AND VASCULAR | Facility: CLINIC | Age: 68
End: 2023-03-15

## 2023-03-15 VITALS
DIASTOLIC BLOOD PRESSURE: 83 MMHG | HEART RATE: 85 BPM | WEIGHT: 167 LBS | SYSTOLIC BLOOD PRESSURE: 137 MMHG | BODY MASS INDEX: 32.62 KG/M2

## 2023-03-15 DIAGNOSIS — E03.9 HYPOTHYROIDISM, UNSPECIFIED: ICD-10-CM

## 2023-03-15 DIAGNOSIS — M81.0 AGE-RELATED OSTEOPOROSIS W/OUT CURRENT PATHOLOGICAL FRACTURE: ICD-10-CM

## 2023-03-15 DIAGNOSIS — D35.2 BENIGN NEOPLASM OF PITUITARY GLAND: ICD-10-CM

## 2023-03-15 PROCEDURE — 96401 CHEMO ANTI-NEOPL SQ/IM: CPT

## 2023-03-15 PROCEDURE — 99214 OFFICE O/P EST MOD 30 MIN: CPT | Mod: 25

## 2023-03-15 RX ORDER — DENOSUMAB 60 MG/ML
60 INJECTION SUBCUTANEOUS
Qty: 1 | Refills: 0 | Status: COMPLETED | OUTPATIENT
Start: 2023-03-15

## 2023-03-15 RX ADMIN — DENOSUMAB 0 MG/ML: 60 INJECTION SUBCUTANEOUS at 00:00

## 2023-03-22 NOTE — PHYSICAL EXAM
[Alert] : alert [Healthy Appearance] : healthy appearance [No Acute Distress] : no acute distress [Normal Sclera/Conjunctiva] : normal sclera/conjunctiva [Visual Fields Grossly Intact] : visual fields grossly intact [Normal Hearing] : hearing was normal [Well Healed Scar] : well healed scar [No Respiratory Distress] : no respiratory distress [No Spinal Tenderness] : no spinal tenderness [No Stigmata of Cushings Syndrome] : no stigmata of Cushings Syndrome [Normal Gait] : normal gait [Normal Insight/Judgement] : insight and judgment were intact [Kyphosis] : no kyphosis present [Scoliosis] : no scoliosis [Acanthosis Nigricans] : no acanthosis nigricans [de-identified] : no moon facies, no supraclavicular fat pads
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
## 2023-03-22 NOTE — ADDENDUM
[FreeTextEntry1] : Procedure Note: Denosumab 60 mg SC administered into left deltoid area. Eugenia Dennis MD\par \par Recent hepatic function panel with ALT 31 U/L (normal: 6-29); I recommended follow-up with primary care. 3/22/23

## 2023-03-22 NOTE — DATA REVIEWED
[FreeTextEntry1] : Laboratories (March 9, 2023) reviewed and significant for: \par Unremarkable complete blood count\par Calcium 9.8 mg/dL (albumin 4.0 g/dL)\par BUN/creatinine 25/0.67 mg/dL (eGFR 97 mL/min)\par ALT 36 U/L (normal: 6-29)\par Alkaline phosphatase 76 U/L\par Phosphorus 2.8 mg/dL\par Magnesium 2.0 mg/dL\par TSH 0.83 uIU/mL\par 25-hydroxyvitamin D 34 ng/mL\par \par Most recent bone mineral density\par Date: January 4, 2023\par Source: Hologic\par Site: [facility]\par \par Site	BMD	T-score	Change previous	Change baseline	\par Lumbar spine	0.905	-1.3	+6.2%* (calculated myself)		\par Femoral neck	0.480	-3.3			\par Total hip                  	0.730	-1.7	+8.8%*		\par Distal radius	0.599	-1.5			\par DXA comments: *Significant change from previous; left hip values\par Vertebral fracture assessment without evidence of compression fractures T8-L5 (my read).\par \par Impression: I have personally reviewed the DXA images and report. There is osteoporosis by the World Health Organization criteria. L1 was removed incorrectly from the official report. There were significant improvements at the lumbar spine and femoral neck from previous. Vertebral fracture assessment without evidence of compression fractures. \par \par MRI pituitary (December 21, 2020) reviewed and significant for:\par Compared to 12/3/2014, there is redemonstration of a hypoenhancing cystic lesion along the anterosuperior aspect of the pituitary gland, measuring approximately 9 mm transverse by 6 mm craniocaudal by 6 mm anteroposterior, and exhibiting mild suprasellar extension overall not substantially changed in size allowing for differences in technique. The pituitary gland otherwise exhibits normal size and morphology. The pituitary stalk is normal in size and midline. There is no mass effect upon the optic chiasm. The cavernous sinuses appear symmetric.\par And axial and FLAIR images of the brain demonstrate a solitary punctate focus of right frontal subcortical white matter T2 prolongation (sagittal FLAIR series 8 image #16), nonspecific.\par IMPRESSION:  \par 1. Redemonstration of an approximately 9 x 6 x 6 mm hypoenhancing cystic lesion along the anterosuperior aspect of the pituitary gland with mild suprasellar extension, not substantially changed in size compared to 12/3/2014 allowing for differences in technique. Differential considerations include a cystic pituitary microadenoma versus exophytic pituitary/Rathke's cleft cyst.\par 2. Solitary punctate focus of right frontal subcortical white matter T2 prolongation, nonspecific.

## 2023-03-28 ENCOUNTER — APPOINTMENT (OUTPATIENT)
Dept: HEART AND VASCULAR | Facility: CLINIC | Age: 68
End: 2023-03-28
Payer: COMMERCIAL

## 2023-03-28 ENCOUNTER — NON-APPOINTMENT (OUTPATIENT)
Age: 68
End: 2023-03-28

## 2023-03-28 VITALS
BODY MASS INDEX: 32.39 KG/M2 | DIASTOLIC BLOOD PRESSURE: 81 MMHG | WEIGHT: 165 LBS | SYSTOLIC BLOOD PRESSURE: 125 MMHG | HEIGHT: 60 IN | OXYGEN SATURATION: 95 % | HEART RATE: 98 BPM

## 2023-03-28 DIAGNOSIS — I47.1 SUPRAVENTRICULAR TACHYCARDIA: ICD-10-CM

## 2023-03-28 DIAGNOSIS — R00.2 PALPITATIONS: ICD-10-CM

## 2023-03-28 PROCEDURE — 93000 ELECTROCARDIOGRAM COMPLETE: CPT

## 2023-03-28 PROCEDURE — 93306 TTE W/DOPPLER COMPLETE: CPT

## 2023-03-28 PROCEDURE — ZZZZZ: CPT

## 2023-03-28 PROCEDURE — 99214 OFFICE O/P EST MOD 30 MIN: CPT | Mod: 25

## 2023-03-29 PROBLEM — R00.2 PALPITATIONS: Status: ACTIVE | Noted: 2017-03-29

## 2023-03-29 PROBLEM — I47.1 SUPRAVENTRICULAR TACHYCARDIA: Status: ACTIVE | Noted: 2023-03-29

## 2023-03-29 NOTE — REVIEW OF SYSTEMS
[Dyspnea on exertion] : dyspnea during exertion [Palpitations] : palpitations [Fever] : no fever [Chills] : no chills [SOB] : no shortness of breath [Chest Discomfort] : no chest discomfort [Lower Ext Edema] : no extremity edema [Orthopnea] : no orthopnea [PND] : no PND [Cough] : no cough [Nausea] : no nausea [Vomiting] : no vomiting [Dysphagia] : no dysphagia [Diarrhea] : diarrhea [Dizziness] : no dizziness [Numbness (Hypoesthesia)] : no numbness [Weakness] : no weakness [Speech Disturbance] : no speech disturbance [Easy Bleeding] : no tendency for easy bleeding

## 2023-03-29 NOTE — PHYSICAL EXAM
[Well Developed] : well developed [Well Nourished] : well nourished [No Acute Distress] : no acute distress [No Carotid Bruit] : no carotid bruit [Normal S1, S2] : normal S1, S2 [No Murmur] : no murmur [Clear Lung Fields] : clear lung fields [Good Air Entry] : good air entry [No Respiratory Distress] : no respiratory distress  [Venous varicosities] : venous varicosities [Moves all extremities] : moves all extremities [No Focal Deficits] : no focal deficits [Normal Speech] : normal speech [Alert and Oriented] : alert and oriented [Normal memory] : normal memory [de-identified] : trace edema

## 2023-03-29 NOTE — DISCUSSION/SUMMARY
[FreeTextEntry1] : 67 year old female with HLD and Thyroid Disease here for follow up and echocardiogram. \par \par Palpitations: EKG SR 92 bpm. Holter reviewed in depth. Patient markers were noted during times of NSR and Sinus Tachycardia. BBLocker vs Dilitiazem for symptom relief considered, however patient deferring any medication at this time\par JOHNSON: No change. Last stress EKG normal ( 3/2022 ) Significant CAD not likely. Continue with risk factor optimization.\par HTN: Goal < 140/90 Losartan 50mg\par HLD: ASCVD score 7.85. Goal LDL < 100. LDL controlled at 85. Continue with Simvastatin 20mg\par Varicose Veins: Noted. Will return for venous ultrasound. \par \par Follow up in 4 months with venous\par \par I, Dr. Angella Swanson personally performed the evaluation and management services for this patient who presents today with a new problem.  The evaluation and management includes conducting the examination assessing all conditions and establishing a new plan of care.  Today my ACP Jacy June was here and recorded the evaluation and management services.\par \par Monitor showed short episodes of SVT which is new.  However the palpitation episodes occurred with sinus rhythm and sinus tachycardia.  The echocardiogram shows an ejection fraction of 70% without significant valvular disease.  We will hold off on medication.  She will increase her exercise. [EKG obtained to assist in diagnosis and management of assessed problem(s)] : EKG obtained to assist in diagnosis and management of assessed problem(s)

## 2023-03-29 NOTE — HISTORY OF PRESENT ILLNESS
[FreeTextEntry1] : 67 year old female with HLD and Thyroid Disease here for follow up and echocardiogram. \par \par Since last visit, she reports having palpitations increase in the past few months. This is reported as being aware of her heart beat. She denies any associated discomfort, shortness of breath, dizziness, falls, syncope or neuro focal deficits. \par \par She was noted with trace edema but no orthopnea or PND. \par \par She has baseline dyspnea if walking rushed, up a hill or 6-8 stairs steps. NO chest pains. This is no change. \par \par She follows up with endocrinology regularly. \par \par Previous Work Up\par LABS (03/2023 ) TSH 0.83\par Holter ( 03/01/2023): 13 days. No AFIb, No pauses, No PVCs, SVT 3 events with longest at 9 beats and fastest at 138bpm. \par LABS ( 01/2023 ) BNP 42, Total Chol 155, HDL 57, LDL 85, TSH 1.56\par Stress EKG ( 3/24/2022 ) 8:00 min, normal ekg test\par Holter ( 12/2019 ) 7 Days. Sinus Rhythm. NO AFIB, PVCs or PACs\par Holter ( 11/2019 ) 5 Days: SR. NO AFIB. PVCs with burden < 1%. HB once, 2 degree type 1 with slowest HR at 50 bpm ( [place] )

## 2023-04-06 ENCOUNTER — RX RENEWAL (OUTPATIENT)
Age: 68
End: 2023-04-06

## 2023-04-20 ENCOUNTER — RX RENEWAL (OUTPATIENT)
Age: 68
End: 2023-04-20

## 2023-05-18 ENCOUNTER — TRANSCRIPTION ENCOUNTER (OUTPATIENT)
Age: 68
End: 2023-05-18

## 2023-06-22 NOTE — ED PROVIDER NOTE - ST/T WAVE
Body Location Override (Optional - Billing Will Still Be Based On Selected Body Map Location If Applicable): suprapubic Bill For Surgical Tray: yes Body Location Override (Optional - Billing Will Still Be Based On Selected Body Map Location If Applicable): left axilla Anesthesia Volume In Cc: 1.5 Size Of Lesion In Cm (Required): 0.6 Hemostasis: Drysol Wound Care: Petrolatum Post-Care Instructions: I reviewed with the patient in detail post-care instructions. Patient is to keep the biopsy site dry overnight, and then apply bacitracin twice daily until healed. Patient may apply hydrogen peroxide soaks to remove any crusting. Depth Of Shave: dermis Medical Necessity Information: It is in your best interest to select a reason for this procedure from the list below. All of these items fulfill various CMS LCD requirements except the new and changing color options. Detail Level: Detailed Medical Necessity Clause: This procedure was medically necessary because the lesion that was treated was: X Size Of Lesion In Cm (Optional): 0 Render Path Notes In Note?: No Billing Type: Third-Party Bill Detail Level: Simple Notification Instructions: Patient will be notified of pathology results. However, patient instructed to call the office if not contacted within 2 weeks. Size Of Lesion In Cm (Required): 1.2 Body Location Override (Optional - Billing Will Still Be Based On Selected Body Map Location If Applicable): right axilla Consent was obtained from the patient. The risks and benefits to therapy were discussed in detail. Specifically, the risks of infection, scarring, bleeding, prolonged wound healing, incomplete removal, allergy to anesthesia, nerve injury and recurrence were addressed. Prior to the procedure, the treatment site was clearly identified and confirmed by the patient. All components of Universal Protocol/PAUSE Rule completed. Biopsy Method: Dermablade Anesthesia Type: 1% lidocaine with epinephrine nsst changes

## 2023-07-11 ENCOUNTER — APPOINTMENT (OUTPATIENT)
Dept: HEART AND VASCULAR | Facility: CLINIC | Age: 68
End: 2023-07-11

## 2023-07-14 ENCOUNTER — APPOINTMENT (OUTPATIENT)
Dept: HEART AND VASCULAR | Facility: CLINIC | Age: 68
End: 2023-07-14
Payer: COMMERCIAL

## 2023-07-14 VITALS — HEART RATE: 78 BPM | SYSTOLIC BLOOD PRESSURE: 124 MMHG | DIASTOLIC BLOOD PRESSURE: 74 MMHG

## 2023-07-14 PROCEDURE — 93970 EXTREMITY STUDY: CPT

## 2023-07-17 DIAGNOSIS — Z98.890 OTHER SPECIFIED POSTPROCEDURAL STATES: ICD-10-CM

## 2023-07-17 DIAGNOSIS — I87.2 VENOUS INSUFFICIENCY (CHRONIC) (PERIPHERAL): ICD-10-CM

## 2023-07-17 DIAGNOSIS — I83.893 VARICOSE VEINS OF BILATERAL LOWER EXTREMITIES WITH OTHER COMPLICATIONS: ICD-10-CM

## 2023-07-18 ENCOUNTER — APPOINTMENT (OUTPATIENT)
Dept: OBGYN | Facility: CLINIC | Age: 68
End: 2023-07-18
Payer: COMMERCIAL

## 2023-07-18 DIAGNOSIS — Z01.419 ENCOUNTER FOR GYNECOLOGICAL EXAMINATION (GENERAL) (ROUTINE) W/OUT ABNORMAL FINDINGS: ICD-10-CM

## 2023-07-18 PROCEDURE — 99397 PER PM REEVAL EST PAT 65+ YR: CPT

## 2023-07-18 NOTE — HISTORY OF PRESENT ILLNESS
[Patient reported mammogram was normal] : Patient reported mammogram was normal [postmenopausal] : postmenopausal [N] : Patient reports normal menses [FreeTextEntry1] : 68 yo patient presents for well women visit, no complaints. She did not do anything about her bladder but has not gotten worse. No bleeding or discharge. Going to [place] next month first time in 10 years [Mammogramdate] : 03/28/23

## 2023-07-18 NOTE — DISCUSSION/SUMMARY
[FreeTextEntry1] : 66 yo patient presents for well women visit, doing well\par \par HCM:PAP and HPV done,breast imaging up to date, Colon cancer screening up to date, bone density up to date\par \par f/u 1 year/PRN \par \par All questions and concerns addressed, patient expressed understanding. Encouraged to contact the office with any questions or concerns.

## 2023-07-18 NOTE — PHYSICAL EXAM
[Chaperone Present] : A chaperone was present in the examining room during all aspects of the physical examination [Appropriately responsive] : appropriately responsive [Alert] : alert [No Acute Distress] : no acute distress [No Lymphadenopathy] : no lymphadenopathy [Soft] : soft [Non-tender] : non-tender [Non-distended] : non-distended [Oriented x3] : oriented x3 [Examination Of The Breasts] : a normal appearance [No Discharge] : no discharge [No Masses] : no breast masses were palpable [Vulvar Atrophy] : vulvar atrophy [Labia Majora] : normal [Labia Minora] : normal [Atrophy] : atrophy [Declined] : Patient declined rectal exam [Normal] : normal [Anteversion] : anteverted [Uterine Adnexae] : normal [FreeTextEntry6] : limited exam  [FreeTextEntry9] : no nodules, no masses

## 2023-07-19 LAB — HPV HIGH+LOW RISK DNA PNL CVX: NOT DETECTED

## 2023-07-20 ENCOUNTER — TRANSCRIPTION ENCOUNTER (OUTPATIENT)
Age: 68
End: 2023-07-20

## 2023-07-21 ENCOUNTER — TRANSCRIPTION ENCOUNTER (OUTPATIENT)
Age: 68
End: 2023-07-21

## 2023-07-31 ENCOUNTER — TRANSCRIPTION ENCOUNTER (OUTPATIENT)
Age: 68
End: 2023-07-31

## 2023-07-31 LAB — CYTOLOGY CVX/VAG DOC THIN PREP: NORMAL

## 2023-10-12 ENCOUNTER — APPOINTMENT (OUTPATIENT)
Dept: ENDOCRINOLOGY | Facility: CLINIC | Age: 68
End: 2023-10-12
Payer: COMMERCIAL

## 2023-10-12 PROCEDURE — 96401 CHEMO ANTI-NEOPL SQ/IM: CPT

## 2023-10-12 RX ADMIN — DENOSUMAB 60 MG/ML: 60 INJECTION SUBCUTANEOUS at 00:00

## 2024-01-01 ENCOUNTER — RX RENEWAL (OUTPATIENT)
Age: 69
End: 2024-01-01

## 2024-03-29 ENCOUNTER — RX RENEWAL (OUTPATIENT)
Age: 69
End: 2024-03-29

## 2024-03-29 RX ORDER — LOSARTAN POTASSIUM 50 MG/1
50 TABLET, FILM COATED ORAL
Qty: 90 | Refills: 1 | Status: ACTIVE | COMMUNITY
Start: 2022-01-21 | End: 1900-01-01

## 2024-05-08 ENCOUNTER — APPOINTMENT (OUTPATIENT)
Dept: ENDOCRINOLOGY | Facility: CLINIC | Age: 69
End: 2024-05-08
Payer: COMMERCIAL

## 2024-05-08 PROCEDURE — 96401 CHEMO ANTI-NEOPL SQ/IM: CPT

## 2024-05-08 RX ORDER — DENOSUMAB 60 MG/ML
60 INJECTION SUBCUTANEOUS
Qty: 1 | Refills: 0 | Status: COMPLETED | OUTPATIENT
Start: 2024-05-08

## 2024-05-08 RX ADMIN — DENOSUMAB 60 MG/ML: 60 INJECTION SUBCUTANEOUS at 00:00

## 2024-05-10 DIAGNOSIS — R92.8 OTHER ABNORMAL AND INCONCLUSIVE FINDINGS ON DIAGNOSTIC IMAGING OF BREAST: ICD-10-CM

## 2024-05-14 ENCOUNTER — TRANSCRIPTION ENCOUNTER (OUTPATIENT)
Age: 69
End: 2024-05-14

## 2024-07-24 ENCOUNTER — APPOINTMENT (OUTPATIENT)
Dept: HEART AND VASCULAR | Facility: CLINIC | Age: 69
End: 2024-07-24

## 2024-07-25 ENCOUNTER — APPOINTMENT (OUTPATIENT)
Dept: OBGYN | Facility: CLINIC | Age: 69
End: 2024-07-25

## 2024-07-25 VITALS
BODY MASS INDEX: 30.04 KG/M2 | HEART RATE: 89 BPM | DIASTOLIC BLOOD PRESSURE: 77 MMHG | WEIGHT: 153 LBS | HEIGHT: 60 IN | SYSTOLIC BLOOD PRESSURE: 110 MMHG | OXYGEN SATURATION: 96 %

## 2024-07-25 PROCEDURE — 99397 PER PM REEVAL EST PAT 65+ YR: CPT

## 2024-07-25 NOTE — DISCUSSION/SUMMARY
[FreeTextEntry1] : 68 year old patient presents for well women visit HCM: PAP and HPV done, breast imaging and Colon cancer screening up to date. f/u 1 year/PRN    All questions and concerns addressed; patient expressed understanding. Encouraged to contact the office with any questions or concerns.

## 2024-07-25 NOTE — PHYSICAL EXAM
[Chaperone Present] : A chaperone was present in the examining room during all aspects of the physical examination [Appropriately responsive] : appropriately responsive [Alert] : alert [No Acute Distress] : no acute distress [Soft] : soft [Non-tender] : non-tender [Non-distended] : non-distended [Oriented x3] : oriented x3 [Examination Of The Breasts] : a normal appearance [No Discharge] : no discharge [No Masses] : no breast masses were palpable [Vulvar Atrophy] : vulvar atrophy [Labia Majora] : normal [Labia Minora] : normal [Atrophy] : atrophy [Normal] : normal [Uterine Adnexae] : normal [No Tenderness] : no tenderness [Nl Sphincter Tone] : normal sphincter tone [FreeTextEntry9] : no nodules, no masses

## 2024-07-25 NOTE — HISTORY OF PRESENT ILLNESS
[Patient reported mammogram was normal] : Patient reported mammogram was normal [Patient reported PAP Smear was normal] : Patient reported PAP Smear was normal [Patient reported bone density results were normal] : Patient reported bone density results were normal [LMP unknown] : LMP unknown [postmenopausal] : postmenopausal [Patient reported colonoscopy was normal] : Patient reported colonoscopy was normal [N] : Patient does not use contraception [Y] : Patient is sexually active [Post-Menopause, No Sxs] : post-menopausal, currently without symptoms [Currently Active] : currently active [Men] : men [No] : No [Patient refuses STI testing] : Patient refuses STI testing [FreeTextEntry1] : 68 year old patient presents for well women visit, no complaints or concerns. [Mammogramdate] : 05/14/24 [BreastSonogramDate] : 05/14/24 [PapSmeardate] : 07/18/23 [BoneDensityDate] : 01/20/23 [TextBox_37] : Scheduled for next week  [ColonoscopyDate] : 2022

## 2024-07-29 LAB — HPV HIGH+LOW RISK DNA PNL CVX: NOT DETECTED

## 2024-07-30 ENCOUNTER — TRANSCRIPTION ENCOUNTER (OUTPATIENT)
Age: 69
End: 2024-07-30

## 2024-07-30 LAB — CYTOLOGY CVX/VAG DOC THIN PREP: ABNORMAL

## 2024-08-06 ENCOUNTER — NON-APPOINTMENT (OUTPATIENT)
Age: 69
End: 2024-08-06

## 2024-08-07 ENCOUNTER — TRANSCRIPTION ENCOUNTER (OUTPATIENT)
Age: 69
End: 2024-08-07

## 2024-08-09 ENCOUNTER — TRANSCRIPTION ENCOUNTER (OUTPATIENT)
Age: 69
End: 2024-08-09

## 2024-09-18 ENCOUNTER — APPOINTMENT (OUTPATIENT)
Dept: ENDOCRINOLOGY | Facility: CLINIC | Age: 69
End: 2024-09-18

## 2024-11-14 ENCOUNTER — APPOINTMENT (OUTPATIENT)
Dept: ENDOCRINOLOGY | Facility: CLINIC | Age: 69
End: 2024-11-14
Payer: COMMERCIAL

## 2024-11-14 ENCOUNTER — NON-APPOINTMENT (OUTPATIENT)
Age: 69
End: 2024-11-14

## 2024-11-14 VITALS
SYSTOLIC BLOOD PRESSURE: 143 MMHG | BODY MASS INDEX: 30.47 KG/M2 | HEART RATE: 90 BPM | DIASTOLIC BLOOD PRESSURE: 76 MMHG | WEIGHT: 156 LBS

## 2024-11-14 DIAGNOSIS — E03.9 HYPOTHYROIDISM, UNSPECIFIED: ICD-10-CM

## 2024-11-14 DIAGNOSIS — M81.0 AGE-RELATED OSTEOPOROSIS W/OUT CURRENT PATHOLOGICAL FRACTURE: ICD-10-CM

## 2024-11-14 DIAGNOSIS — E55.9 VITAMIN D DEFICIENCY, UNSPECIFIED: ICD-10-CM

## 2024-11-14 DIAGNOSIS — D35.2 BENIGN NEOPLASM OF PITUITARY GLAND: ICD-10-CM

## 2024-11-14 PROCEDURE — 96401 CHEMO ANTI-NEOPL SQ/IM: CPT

## 2024-11-14 PROCEDURE — 99214 OFFICE O/P EST MOD 30 MIN: CPT | Mod: 25

## 2024-11-14 RX ORDER — COLD-HOT PACK
EACH MISCELLANEOUS
Refills: 0 | Status: ACTIVE | COMMUNITY

## 2024-11-14 RX ORDER — DENOSUMAB 60 MG/ML
60 INJECTION SUBCUTANEOUS
Qty: 1 | Refills: 0 | Status: COMPLETED | OUTPATIENT
Start: 2024-11-14

## 2024-11-14 RX ADMIN — DENOSUMAB 60 MG/ML: 60 INJECTION SUBCUTANEOUS at 00:00

## 2024-11-18 ENCOUNTER — RX RENEWAL (OUTPATIENT)
Age: 69
End: 2024-11-18

## 2024-12-31 ENCOUNTER — NON-APPOINTMENT (OUTPATIENT)
Age: 69
End: 2024-12-31

## 2024-12-31 ENCOUNTER — APPOINTMENT (OUTPATIENT)
Dept: HEART AND VASCULAR | Facility: CLINIC | Age: 69
End: 2024-12-31
Payer: COMMERCIAL

## 2024-12-31 VITALS
BODY MASS INDEX: 31.41 KG/M2 | HEART RATE: 64 BPM | WEIGHT: 160 LBS | DIASTOLIC BLOOD PRESSURE: 83 MMHG | OXYGEN SATURATION: 96 % | TEMPERATURE: 98.1 F | HEIGHT: 60 IN | SYSTOLIC BLOOD PRESSURE: 152 MMHG

## 2024-12-31 VITALS — DIASTOLIC BLOOD PRESSURE: 82 MMHG | SYSTOLIC BLOOD PRESSURE: 138 MMHG

## 2024-12-31 DIAGNOSIS — I44.1 ATRIOVENTRICULAR BLOCK, SECOND DEGREE: ICD-10-CM

## 2024-12-31 DIAGNOSIS — I10 ESSENTIAL (PRIMARY) HYPERTENSION: ICD-10-CM

## 2024-12-31 DIAGNOSIS — R06.09 OTHER FORMS OF DYSPNEA: ICD-10-CM

## 2024-12-31 DIAGNOSIS — E78.00 PURE HYPERCHOLESTEROLEMIA, UNSPECIFIED: ICD-10-CM

## 2024-12-31 DIAGNOSIS — R07.9 CHEST PAIN, UNSPECIFIED: ICD-10-CM

## 2024-12-31 PROCEDURE — 93306 TTE W/DOPPLER COMPLETE: CPT

## 2024-12-31 PROCEDURE — 99214 OFFICE O/P EST MOD 30 MIN: CPT | Mod: 25

## 2024-12-31 PROCEDURE — ZZZZZ: CPT | Mod: NC

## 2024-12-31 PROCEDURE — 93000 ELECTROCARDIOGRAM COMPLETE: CPT

## 2025-01-02 LAB
ANION GAP SERPL CALC-SCNC: 10 MMOL/L
BUN SERPL-MCNC: 22 MG/DL
CALCIUM SERPL-MCNC: 9.3 MG/DL
CHLORIDE SERPL-SCNC: 103 MMOL/L
CHOLEST SERPL-MCNC: 168 MG/DL
CO2 SERPL-SCNC: 26 MMOL/L
CREAT SERPL-MCNC: 0.54 MG/DL
EGFR: 101 ML/MIN/1.73M2
GLUCOSE SERPL-MCNC: 90 MG/DL
HCT VFR BLD CALC: 42.3 %
HDLC SERPL-MCNC: 66 MG/DL
HGB BLD-MCNC: 13.2 G/DL
LDLC SERPL CALC-MCNC: 89 MG/DL
NONHDLC SERPL-MCNC: 102 MG/DL
NT-PROBNP SERPL-MCNC: <36 PG/ML
POTASSIUM SERPL-SCNC: 4.3 MMOL/L
SODIUM SERPL-SCNC: 140 MMOL/L
TRIGL SERPL-MCNC: 65 MG/DL
TSH SERPL-ACNC: 1 UIU/ML

## 2025-01-03 ENCOUNTER — TRANSCRIPTION ENCOUNTER (OUTPATIENT)
Age: 70
End: 2025-01-03

## 2025-01-08 ENCOUNTER — APPOINTMENT (OUTPATIENT)
Dept: HEART AND VASCULAR | Facility: CLINIC | Age: 70
End: 2025-01-08
Payer: COMMERCIAL

## 2025-01-08 VITALS
TEMPERATURE: 97.7 F | BODY MASS INDEX: 31.41 KG/M2 | SYSTOLIC BLOOD PRESSURE: 128 MMHG | HEART RATE: 89 BPM | WEIGHT: 160 LBS | OXYGEN SATURATION: 96 % | DIASTOLIC BLOOD PRESSURE: 86 MMHG | HEIGHT: 60 IN

## 2025-01-08 VITALS — DIASTOLIC BLOOD PRESSURE: 77 MMHG | SYSTOLIC BLOOD PRESSURE: 123 MMHG

## 2025-01-08 PROCEDURE — 99213 OFFICE O/P EST LOW 20 MIN: CPT

## 2025-01-08 PROCEDURE — 93015 CV STRESS TEST SUPVJ I&R: CPT

## 2025-01-08 PROCEDURE — 99213 OFFICE O/P EST LOW 20 MIN: CPT | Mod: 25

## 2025-03-24 ENCOUNTER — RX RENEWAL (OUTPATIENT)
Age: 70
End: 2025-03-24

## 2025-04-18 ENCOUNTER — NON-APPOINTMENT (OUTPATIENT)
Age: 70
End: 2025-04-18

## 2025-04-30 ENCOUNTER — TRANSCRIPTION ENCOUNTER (OUTPATIENT)
Age: 70
End: 2025-04-30

## 2025-05-08 ENCOUNTER — TRANSCRIPTION ENCOUNTER (OUTPATIENT)
Age: 70
End: 2025-05-08

## 2025-05-14 ENCOUNTER — APPOINTMENT (OUTPATIENT)
Dept: OBGYN | Facility: CLINIC | Age: 70
End: 2025-05-14
Payer: COMMERCIAL

## 2025-05-14 VITALS
SYSTOLIC BLOOD PRESSURE: 126 MMHG | DIASTOLIC BLOOD PRESSURE: 69 MMHG | BODY MASS INDEX: 32.42 KG/M2 | OXYGEN SATURATION: 96 % | WEIGHT: 166 LBS | HEART RATE: 78 BPM

## 2025-05-14 DIAGNOSIS — R10.2 PELVIC AND PERINEAL PAIN: ICD-10-CM

## 2025-05-14 DIAGNOSIS — Z01.419 ENCOUNTER FOR GYNECOLOGICAL EXAMINATION (GENERAL) (ROUTINE) W/OUT ABNORMAL FINDINGS: ICD-10-CM

## 2025-05-14 PROCEDURE — 99459 PELVIC EXAMINATION: CPT

## 2025-05-14 PROCEDURE — 99397 PER PM REEVAL EST PAT 65+ YR: CPT

## 2025-05-15 LAB
APPEARANCE: CLEAR
BILIRUBIN URINE: NEGATIVE
BLOOD URINE: NEGATIVE
COLOR: YELLOW
GLUCOSE QUALITATIVE U: NEGATIVE MG/DL
KETONES URINE: NEGATIVE MG/DL
LEUKOCYTE ESTERASE URINE: NEGATIVE
NITRITE URINE: NEGATIVE
PH URINE: 8
PROTEIN URINE: NEGATIVE MG/DL
SPECIFIC GRAVITY URINE: 1.01
UROBILINOGEN URINE: 0.2 MG/DL

## 2025-05-16 ENCOUNTER — OUTPATIENT (OUTPATIENT)
Dept: OUTPATIENT SERVICES | Facility: HOSPITAL | Age: 70
LOS: 1 days | End: 2025-05-16
Payer: COMMERCIAL

## 2025-05-16 ENCOUNTER — APPOINTMENT (OUTPATIENT)
Dept: INFUSION THERAPY | Facility: CLINIC | Age: 70
End: 2025-05-16

## 2025-05-16 VITALS
HEART RATE: 88 BPM | SYSTOLIC BLOOD PRESSURE: 113 MMHG | WEIGHT: 166.01 LBS | TEMPERATURE: 98 F | RESPIRATION RATE: 18 BRPM | OXYGEN SATURATION: 95 % | HEIGHT: 60 IN | DIASTOLIC BLOOD PRESSURE: 75 MMHG

## 2025-05-16 DIAGNOSIS — M81.0 AGE-RELATED OSTEOPOROSIS WITHOUT CURRENT PATHOLOGICAL FRACTURE: ICD-10-CM

## 2025-05-16 LAB
BACTERIA UR CULT: NORMAL
HPV HIGH+LOW RISK DNA PNL CVX: NOT DETECTED

## 2025-05-16 PROCEDURE — 96372 THER/PROPH/DIAG INJ SC/IM: CPT

## 2025-05-16 RX ORDER — DENOSUMAB 60 MG/ML
60 INJECTION SUBCUTANEOUS ONCE
Refills: 0 | Status: COMPLETED | OUTPATIENT
Start: 2025-05-16 | End: 2025-05-16

## 2025-05-16 RX ADMIN — DENOSUMAB 60 MILLIGRAM(S): 60 INJECTION SUBCUTANEOUS at 08:41

## 2025-05-16 NOTE — DISCHARGE INSTRUCTIONS: GENERAL THERAPY - DC SYMPTOM 3
Occupational Therapy  Visit Type: treatment  Precautions:  Medical precautions:  fall risk; contact precautions and droplet precautions.    Lines:     Basic: IV      Lines in chart and on patient reviewed, precautions maintained throughout session.  Safety Measures: bed alarm    SUBJECTIVE  Patient agreed to participate in therapy this date.  Patient / Family Goal: maximize function    Pain     Location: hip    OBJECTIVE   Level of consciousness: alert    Oriented to person, place and time     Affect/Behavior: alert, cooperative and pleasant  Hand Dominance: right  Range of Motion (measured in degrees unless otherwise indicated)  WNL: LUE, RUE, except as noted, left shoulder, right shoulder  Shoulder:   - Flexion (180):      • Left:  90   Right:  90  Strength (out of 5 unless otherwise indicated)  Finger/Thumb:  Gross :  strength grossly equal bilateral  Balance (trials in sec unless otherwise indicated)  Sitting:   - Static:  stand by assist    - Dynamic:  stand by assist    Sensation - Upper Extremity  C4 (shoulder, clavicular and upper scapular areas):     Light Touch: Left: intact Right: intact  C5 (deltoid area, anterior aspect of entire arm to base of thumb):     Light Touch: • Left: intact • Right: intact  C6 (anterior upper extremity, radial side of hand to 1st and 2nd digit):     - Light Touch: • Left: intact • Right: intact  C7 (lateral upper extremity and forearm to 2nd through 4th digit):     - Light Touch: • Left: intact • Right: intact  C8 (medial upper extremity and forearm to 3rd through 5th):     - Light Touch: • Left: intact • Right: intact  T1 (medial side of forearm to base of 5th digit):    - Light Touch: • Left: intact • Right: intact  Coordination  Gross Motor:  LUE: grossly intact  RUE: grossly intact  Fine Motor:  LUE: grossly intact RUE: grossly intact  Bed mobility:      Supine to sit: stand by assist (HOB elevated, increased time)    Sit to supine: stand by assist (increased  Signs of bleeding (nose bleeds, bleeding gums, blackened stool, unusual bruising) time, HOB elevated, bed rails )  Activities of Daily Living (ADLs):  Eating:     Assist: independent  Grooming/Oral Hygiene:     Grooming assist: set up and stand by assist    Oral hygiene assist: set up and stand by assist    Position: edge of bed  Upper Body Dressing:    Assist: set up and stand by assist  Lower Body Dressing:     Assist: total assist - dependent  (d/t hip pain and dizziness )  Toileting:     Assist: maximal assist  Bathing:     Assist: moderate assist      Interventions    Training provided: ADL training, activity tolerance, bed mobility training, safety training, compensatory techniques and positioningRehab Safety  Therapist donned the following PPE for this patient encounter:  Gloves, Gown, N95 Respirator Mask and Face Shield    Therapy aide or other healthcare professional present during this patient encounter:   No  If yes, that healthcare professional wore the following PPE: Not Applicable    The patient was wearing a mask during this session:  No      Skilled input: verbal instruction/cues  Verbal Consent: Writer verbally educated and received verbal consent for hand placement, positioning of patient, and techniques to be performed today from patient for clothing adjustments for techniques and therapist position for techniques as described above and how they are pertinent to the patient's plan of care.        ASSESSMENT  Impairments: bed mobility, safety awareness, activity tolerance, strength, range of motion and balance  Functional Limitations: bed mobility, grooming, toileting, dressing, bathing, functional transfers and showering     Discharge Recommendations:   Recommendations for Discharge: OT IL: Patient is appropriate for daily Occupational Therapy     PT/OT Mobility Equipment for Discharge: has own rollator and RW         Progress: slow progress, medical status limitations    • Skilled therapy is required to address these limitations in attempt to maximize the patient's  independence.    Education Provided:   Learning Assessment:  - Primary learner: patient  - Are they ready to learn: yes  - Preferred learning style: verbal  - Barriers to learning: no barriers apparent at this time  Education provided during session:  - Receiving Education: patient  - Pt very motivated to get stronger. Pt limited by dizziness and hip pain   - Results of above outlined education: Verbalizes understanding    Patient at End of Session:   Location: in bed  Safety measures: alarm system in place/re-engaged, call light within reach, equipment intact and lines intact  Handoff to: nurse    PLAN  Suggestions for next session as indicated: OT Frequency: 3 days/week  Frequency Comments: 06/23 MENDEZ KV French [place] 2/3 OT to see    Interventions: activity tolerance training, ADL retraining, balance, compensatory technique education, compensatory techniques, transfer training, patient education, positioning, functional transfer training, patient/family training and safety training  Agreement to plan and goals: patient agrees with goals and treatment plan      GOALS  Short Term Goals:   Participate in toilet t/f assessment, goals to be determined   Long Term Goals: (to be met by time of discharge from hospital)  Lower body dressing: Patient will complete lower body dressing moderate assist. Status: progressing/ongoing  Toileting: Patient will complete toileting minimal assist.  Status: progressing/ongoing  Bathing: Patient will complete bathingminimal assist  Status: progressing/ongoing  Documented in the chart in the following areas: Assessment. Plan.      Therapy procedure time and total treatment time can be found documented on the Time Entry flowsheet

## 2025-05-19 ENCOUNTER — TRANSCRIPTION ENCOUNTER (OUTPATIENT)
Age: 70
End: 2025-05-19

## 2025-05-19 LAB — CYTOLOGY CVX/VAG DOC THIN PREP: ABNORMAL

## 2025-05-20 ENCOUNTER — TRANSCRIPTION ENCOUNTER (OUTPATIENT)
Age: 70
End: 2025-05-20

## 2025-05-23 ENCOUNTER — TRANSCRIPTION ENCOUNTER (OUTPATIENT)
Age: 70
End: 2025-05-23

## 2025-06-17 ENCOUNTER — APPOINTMENT (OUTPATIENT)
Dept: OBGYN | Facility: CLINIC | Age: 70
End: 2025-06-17
Payer: COMMERCIAL

## 2025-06-17 ENCOUNTER — NON-APPOINTMENT (OUTPATIENT)
Age: 70
End: 2025-06-17

## 2025-06-17 VITALS
OXYGEN SATURATION: 98 % | BODY MASS INDEX: 32.2 KG/M2 | HEART RATE: 79 BPM | SYSTOLIC BLOOD PRESSURE: 144 MMHG | HEIGHT: 60 IN | DIASTOLIC BLOOD PRESSURE: 86 MMHG | WEIGHT: 164 LBS

## 2025-06-17 PROCEDURE — 99214 OFFICE O/P EST MOD 30 MIN: CPT

## 2025-06-23 ENCOUNTER — NON-APPOINTMENT (OUTPATIENT)
Age: 70
End: 2025-06-23

## 2025-06-24 ENCOUNTER — APPOINTMENT (OUTPATIENT)
Dept: OBGYN | Facility: CLINIC | Age: 70
End: 2025-06-24

## 2025-06-27 ENCOUNTER — TRANSCRIPTION ENCOUNTER (OUTPATIENT)
Age: 70
End: 2025-06-27

## 2025-07-15 ENCOUNTER — APPOINTMENT (OUTPATIENT)
Dept: OBGYN | Facility: CLINIC | Age: 70
End: 2025-07-15
Payer: COMMERCIAL

## 2025-07-15 PROCEDURE — 99214 OFFICE O/P EST MOD 30 MIN: CPT | Mod: 95

## 2025-07-29 ENCOUNTER — APPOINTMENT (OUTPATIENT)
Dept: OBGYN | Facility: CLINIC | Age: 70
End: 2025-07-29
Payer: COMMERCIAL

## 2025-07-29 DIAGNOSIS — G89.18 OTHER ACUTE POSTPROCEDURAL PAIN: ICD-10-CM

## 2025-07-29 DIAGNOSIS — R93.89 ABNORMAL FINDINGS ON DIAGNOSTIC IMAGING OF OTHER SPECIFIED BODY STRUCTURES: ICD-10-CM

## 2025-07-29 PROCEDURE — 99214 OFFICE O/P EST MOD 30 MIN: CPT | Mod: 95

## 2025-07-29 RX ORDER — IBUPROFEN 800 MG/1
800 TABLET, FILM COATED ORAL
Qty: 30 | Refills: 0 | Status: ACTIVE | COMMUNITY
Start: 2025-07-29 | End: 1900-01-01

## 2025-08-08 VITALS
WEIGHT: 168.87 LBS | OXYGEN SATURATION: 98 % | HEIGHT: 60 IN | DIASTOLIC BLOOD PRESSURE: 88 MMHG | SYSTOLIC BLOOD PRESSURE: 142 MMHG | HEART RATE: 66 BPM | TEMPERATURE: 98 F | RESPIRATION RATE: 17 BRPM

## 2025-08-11 ENCOUNTER — APPOINTMENT (OUTPATIENT)
Dept: OBGYN | Facility: HOSPITAL | Age: 70
End: 2025-08-11

## 2025-08-11 ENCOUNTER — TRANSCRIPTION ENCOUNTER (OUTPATIENT)
Age: 70
End: 2025-08-11

## 2025-08-11 ENCOUNTER — RESULT REVIEW (OUTPATIENT)
Age: 70
End: 2025-08-11

## 2025-08-11 ENCOUNTER — OUTPATIENT (OUTPATIENT)
Dept: OUTPATIENT SERVICES | Facility: HOSPITAL | Age: 70
LOS: 1 days | End: 2025-08-11
Payer: COMMERCIAL

## 2025-08-11 VITALS
TEMPERATURE: 98 F | OXYGEN SATURATION: 96 % | RESPIRATION RATE: 16 BRPM | HEART RATE: 54 BPM | DIASTOLIC BLOOD PRESSURE: 72 MMHG | SYSTOLIC BLOOD PRESSURE: 125 MMHG

## 2025-08-11 DIAGNOSIS — Z98.890 OTHER SPECIFIED POSTPROCEDURAL STATES: Chronic | ICD-10-CM

## 2025-08-11 DIAGNOSIS — Z87.59 PERSONAL HISTORY OF OTHER COMPLICATIONS OF PREGNANCY, CHILDBIRTH AND THE PUERPERIUM: Chronic | ICD-10-CM

## 2025-08-11 DIAGNOSIS — Z96.659 PRESENCE OF UNSPECIFIED ARTIFICIAL KNEE JOINT: Chronic | ICD-10-CM

## 2025-08-11 DIAGNOSIS — H26.9 UNSPECIFIED CATARACT: Chronic | ICD-10-CM

## 2025-08-11 LAB
BLD GP AB SCN SERPL QL: NEGATIVE — SIGNIFICANT CHANGE UP
RH IG SCN BLD-IMP: POSITIVE — SIGNIFICANT CHANGE UP

## 2025-08-11 PROCEDURE — 86900 BLOOD TYPING SEROLOGIC ABO: CPT

## 2025-08-11 PROCEDURE — 88305 TISSUE EXAM BY PATHOLOGIST: CPT | Mod: 26

## 2025-08-11 PROCEDURE — 58558 HYSTEROSCOPY BIOPSY: CPT

## 2025-08-11 PROCEDURE — 88305 TISSUE EXAM BY PATHOLOGIST: CPT

## 2025-08-11 PROCEDURE — 86850 RBC ANTIBODY SCREEN: CPT

## 2025-08-11 PROCEDURE — 86901 BLOOD TYPING SEROLOGIC RH(D): CPT

## 2025-08-11 PROCEDURE — 58561 HYSTEROSCOPY REMOVE MYOMA: CPT

## 2025-08-11 DEVICE — MYOSURE TISSUE REMOVAL DEVICE REACH: Type: IMPLANTABLE DEVICE | Status: FUNCTIONAL

## 2025-08-11 DEVICE — AVETA FLEX RESECTING DEVICE 2.9MM: Type: IMPLANTABLE DEVICE | Status: FUNCTIONAL

## 2025-08-11 DEVICE — MYOSURE TISSUE REMOVAL DEVICE XL: Type: IMPLANTABLE DEVICE | Status: FUNCTIONAL

## 2025-08-11 RX ORDER — CELECOXIB 50 MG/1
400 CAPSULE ORAL ONCE
Refills: 0 | Status: COMPLETED | OUTPATIENT
Start: 2025-08-11 | End: 2025-08-11

## 2025-08-11 RX ORDER — LEVOTHYROXINE SODIUM 300 MCG
1 TABLET ORAL
Refills: 0 | DISCHARGE

## 2025-08-11 RX ORDER — ACETAMINOPHEN 500 MG/5ML
1000 LIQUID (ML) ORAL ONCE
Refills: 0 | Status: COMPLETED | OUTPATIENT
Start: 2025-08-11 | End: 2025-08-11

## 2025-08-11 RX ORDER — KETOROLAC TROMETHAMINE 30 MG/ML
30 INJECTION, SOLUTION INTRAMUSCULAR; INTRAVENOUS EVERY 6 HOURS
Refills: 0 | Status: DISCONTINUED | OUTPATIENT
Start: 2025-08-11 | End: 2025-08-11

## 2025-08-11 RX ORDER — ACETAMINOPHEN 500 MG/5ML
1000 LIQUID (ML) ORAL EVERY 6 HOURS
Refills: 0 | Status: DISCONTINUED | OUTPATIENT
Start: 2025-08-11 | End: 2025-08-11

## 2025-08-11 RX ORDER — ONDANSETRON HCL/PF 4 MG/2 ML
8 VIAL (ML) INJECTION EVERY 6 HOURS
Refills: 0 | Status: DISCONTINUED | OUTPATIENT
Start: 2025-08-11 | End: 2025-08-11

## 2025-08-11 RX ORDER — METOCLOPRAMIDE HCL 10 MG
10 TABLET ORAL EVERY 6 HOURS
Refills: 0 | Status: DISCONTINUED | OUTPATIENT
Start: 2025-08-11 | End: 2025-08-11

## 2025-08-11 RX ORDER — SIMETHICONE 80 MG
80 TABLET,CHEWABLE ORAL EVERY 6 HOURS
Refills: 0 | Status: DISCONTINUED | OUTPATIENT
Start: 2025-08-11 | End: 2025-08-11

## 2025-08-11 RX ORDER — SODIUM CHLORIDE 9 G/1000ML
1000 INJECTION, SOLUTION INTRAVENOUS
Refills: 0 | Status: DISCONTINUED | OUTPATIENT
Start: 2025-08-11 | End: 2025-08-11

## 2025-08-11 RX ORDER — OXYCODONE HYDROCHLORIDE 30 MG/1
5 TABLET ORAL EVERY 4 HOURS
Refills: 0 | Status: DISCONTINUED | OUTPATIENT
Start: 2025-08-11 | End: 2025-08-11

## 2025-08-11 RX ORDER — HYDROMORPHONE/SOD CHLOR,ISO/PF 2 MG/10 ML
0.2 SYRINGE (ML) INJECTION
Refills: 0 | Status: DISCONTINUED | OUTPATIENT
Start: 2025-08-11 | End: 2025-08-11

## 2025-08-11 RX ORDER — OMEPRAZOLE 20 MG/1
1 CAPSULE, DELAYED RELEASE ORAL
Refills: 0 | DISCHARGE

## 2025-08-11 RX ORDER — LOSARTAN POTASSIUM 100 MG/1
1 TABLET, FILM COATED ORAL
Refills: 0 | DISCHARGE

## 2025-08-11 RX ORDER — GABAPENTIN 400 MG/1
1 CAPSULE ORAL
Refills: 0 | DISCHARGE

## 2025-08-11 RX ORDER — OXYCODONE HYDROCHLORIDE 30 MG/1
10 TABLET ORAL EVERY 4 HOURS
Refills: 0 | Status: DISCONTINUED | OUTPATIENT
Start: 2025-08-11 | End: 2025-08-11

## 2025-08-11 RX ADMIN — Medication 0.2 MILLIGRAM(S): at 12:34

## 2025-08-11 RX ADMIN — Medication 1000 MILLIGRAM(S): at 09:28

## 2025-08-11 RX ADMIN — CELECOXIB 400 MILLIGRAM(S): 50 CAPSULE ORAL at 09:29

## 2025-08-19 ENCOUNTER — NON-APPOINTMENT (OUTPATIENT)
Age: 70
End: 2025-08-19

## 2025-09-09 ENCOUNTER — APPOINTMENT (OUTPATIENT)
Dept: OBGYN | Facility: CLINIC | Age: 70
End: 2025-09-09
Payer: COMMERCIAL

## 2025-09-09 ENCOUNTER — NON-APPOINTMENT (OUTPATIENT)
Age: 70
End: 2025-09-09

## 2025-09-09 DIAGNOSIS — R93.89 ABNORMAL FINDINGS ON DIAGNOSTIC IMAGING OF OTHER SPECIFIED BODY STRUCTURES: ICD-10-CM

## 2025-09-09 PROCEDURE — 99213 OFFICE O/P EST LOW 20 MIN: CPT

## 2025-09-09 PROCEDURE — 99459 PELVIC EXAMINATION: CPT

## (undated) DEVICE — ACCESSORY AVETA WASTE MANAGEMENT 3MM

## (undated) DEVICE — VENODYNE/SCD SLEEVE CALF MEDIUM

## (undated) DEVICE — Device

## (undated) DEVICE — PACK D&C

## (undated) DEVICE — POSITIONER FOAM EGG CRATE ULNAR 2PCS (PINK)

## (undated) DEVICE — MYOSURE SCOPE SEAL

## (undated) DEVICE — DRSG TELFA 3 X 8

## (undated) DEVICE — AVETA FLUID MANAGEMENT ACCESSORY

## (undated) DEVICE — TUBING STRYKER HYSTEROSCOPY INFLOW OUTFLOW

## (undated) DEVICE — PRESSURE INFUSOR BAG 3000ML

## (undated) DEVICE — WARMING BLANKET UPPER ADULT

## (undated) DEVICE — FLUENT FMS PROCEDURE KIT

## (undated) DEVICE — SOL IRR BAG NS 0.9% 3000ML

## (undated) DEVICE — AVETA CORAL HYSTEROSCOPE 4.6MM DISP